# Patient Record
Sex: MALE | Race: WHITE | HISPANIC OR LATINO | Employment: FULL TIME | URBAN - METROPOLITAN AREA
[De-identification: names, ages, dates, MRNs, and addresses within clinical notes are randomized per-mention and may not be internally consistent; named-entity substitution may affect disease eponyms.]

---

## 2020-09-11 ENCOUNTER — TELEPHONE (OUTPATIENT)
Dept: CARDIOLOGY | Facility: MEDICAL CENTER | Age: 45
End: 2020-09-11

## 2020-09-14 ENCOUNTER — OFFICE VISIT (OUTPATIENT)
Dept: CARDIOLOGY | Facility: MEDICAL CENTER | Age: 45
End: 2020-09-14
Payer: COMMERCIAL

## 2020-09-14 VITALS
BODY MASS INDEX: 25.51 KG/M2 | DIASTOLIC BLOOD PRESSURE: 60 MMHG | OXYGEN SATURATION: 97 % | WEIGHT: 178.2 LBS | HEART RATE: 64 BPM | HEIGHT: 70 IN | SYSTOLIC BLOOD PRESSURE: 88 MMHG

## 2020-09-14 DIAGNOSIS — E78.5 HYPERLIPIDEMIA, UNSPECIFIED HYPERLIPIDEMIA TYPE: ICD-10-CM

## 2020-09-14 DIAGNOSIS — I25.10 CORONARY ARTERY DISEASE INVOLVING NATIVE HEART WITHOUT ANGINA PECTORIS, UNSPECIFIED VESSEL OR LESION TYPE: ICD-10-CM

## 2020-09-14 LAB — EKG IMPRESSION: NORMAL

## 2020-09-14 PROCEDURE — 99204 OFFICE O/P NEW MOD 45 MIN: CPT | Mod: 25 | Performed by: INTERNAL MEDICINE

## 2020-09-14 PROCEDURE — 93000 ELECTROCARDIOGRAM COMPLETE: CPT | Performed by: INTERNAL MEDICINE

## 2020-09-14 RX ORDER — ROSUVASTATIN CALCIUM 40 MG/1
40 TABLET, COATED ORAL DAILY
COMMUNITY
Start: 2020-06-26 | End: 2020-10-26 | Stop reason: SDUPTHER

## 2020-09-14 RX ORDER — CLOPIDOGREL BISULFATE 75 MG/1
1 TABLET ORAL DAILY
COMMUNITY
Start: 2020-08-06 | End: 2020-09-14

## 2020-09-14 RX ORDER — NITROGLYCERIN 0.4 MG/1
TABLET SUBLINGUAL
COMMUNITY
Start: 2019-08-02 | End: 2021-08-01

## 2020-09-14 RX ORDER — NABUMETONE 500 MG/1
TABLET, FILM COATED ORAL
COMMUNITY
Start: 2020-02-22 | End: 2020-09-14

## 2020-09-14 RX ORDER — LISINOPRIL 2.5 MG/1
TABLET ORAL
COMMUNITY
Start: 2020-05-12 | End: 2020-09-14

## 2020-09-14 ASSESSMENT — ENCOUNTER SYMPTOMS
ALTERED MENTAL STATUS: 0
COUGH: 0
PND: 0
DIZZINESS: 0
PALPITATIONS: 0
CONSTIPATION: 0
NEAR-SYNCOPE: 0
FLANK PAIN: 0
HEARTBURN: 0
ORTHOPNEA: 0
NAUSEA: 0
BACK PAIN: 0
ABDOMINAL PAIN: 0
SYNCOPE: 0
DEPRESSION: 0
DIARRHEA: 0
IRREGULAR HEARTBEAT: 0
CLAUDICATION: 0
FEVER: 0
WEIGHT LOSS: 0
DYSPNEA ON EXERTION: 0
SHORTNESS OF BREATH: 0
DECREASED APPETITE: 0
VOMITING: 0
BLURRED VISION: 0
WEIGHT GAIN: 0

## 2020-09-14 NOTE — PROGRESS NOTES
Cardiology Note    CAD    History of Present Illness: Konstantin Cheung is a 45 y.o. male PMH CAD, family history premature cardiac disease, HLD who presents for    Previous cardiologist Dr Gadiel Maldonado. Seen for CAD s/p PCI ANN to mLAD and HLD. He was recommended plavix 12 months until 08/2020.     No recurrent cardiac symptoms since PCI. He has been orthostatic, especially when standing from sitting. He is active and currently remodeling his house and denies cardiac complaints. Has modified his diet and eats very little meat. Denies toxic social habits.    Review of Systems   Constitution: Negative for decreased appetite, fever, malaise/fatigue, weight gain and weight loss.   HENT: Negative for congestion and nosebleeds.    Eyes: Negative for blurred vision.   Cardiovascular: Negative for chest pain, claudication, dyspnea on exertion, irregular heartbeat, leg swelling, near-syncope, orthopnea, palpitations, paroxysmal nocturnal dyspnea and syncope.   Respiratory: Negative for cough and shortness of breath.    Endocrine: Negative for cold intolerance and heat intolerance.   Skin: Negative for rash.   Musculoskeletal: Negative for back pain.   Gastrointestinal: Negative for abdominal pain, constipation, diarrhea, heartburn, melena, nausea and vomiting.   Genitourinary: Negative for dysuria, flank pain and hematuria.   Neurological: Negative for dizziness.   Psychiatric/Behavioral: Negative for altered mental status and depression.         Past Medical History:   Diagnosis Date   • CAD (coronary artery disease)          Past Surgical History:   Procedure Laterality Date   • ANGIOPLASTY           Current Outpatient Medications   Medication Sig Dispense Refill   • nitroglycerin (NITROSTAT) 0.4 MG SL Tab Place  under tongue.     • rosuvastatin (CRESTOR) 40 MG tablet Take 40 mg by mouth every day.     • aspirin EC (ECOTRIN) 81 MG Tablet Delayed Response Take 1 Tab by mouth every day. 90 Tab 3     No current  "facility-administered medications for this visit.          No Known Allergies      History reviewed. No pertinent family history.      Social History     Socioeconomic History   • Marital status:      Spouse name: Not on file   • Number of children: Not on file   • Years of education: Not on file   • Highest education level: Not on file   Occupational History   • Not on file   Social Needs   • Financial resource strain: Not on file   • Food insecurity     Worry: Not on file     Inability: Not on file   • Transportation needs     Medical: Not on file     Non-medical: Not on file   Tobacco Use   • Smoking status: Never Smoker   • Smokeless tobacco: Never Used   Substance and Sexual Activity   • Alcohol use: Not on file   • Drug use: Not on file   • Sexual activity: Not on file   Lifestyle   • Physical activity     Days per week: Not on file     Minutes per session: Not on file   • Stress: Not on file   Relationships   • Social connections     Talks on phone: Not on file     Gets together: Not on file     Attends Mandaen service: Not on file     Active member of club or organization: Not on file     Attends meetings of clubs or organizations: Not on file     Relationship status: Not on file   • Intimate partner violence     Fear of current or ex partner: Not on file     Emotionally abused: Not on file     Physically abused: Not on file     Forced sexual activity: Not on file   Other Topics Concern   • Not on file   Social History Narrative   • Not on file         Physical Exam:  Ambulatory Vitals  BP (!) 88/60 (BP Location: Left arm, Patient Position: Sitting, BP Cuff Size: Adult)   Pulse 64   Ht 1.778 m (5' 10\")   Wt 80.8 kg (178 lb 3.2 oz)   SpO2 97%    BP Readings from Last 4 Encounters:   09/14/20 (!) 88/60     Weight/BMI:   Vitals:    09/14/20 1248   BP: (!) 88/60   Weight: 80.8 kg (178 lb 3.2 oz)   Height: 1.778 m (5' 10\")    Body mass index is 25.57 kg/m².  Wt Readings from Last 4 Encounters: "   09/14/20 80.8 kg (178 lb 3.2 oz)       Physical Exam   Constitutional: He is oriented to person, place, and time and well-developed, well-nourished, and in no distress. No distress.   HENT:   Head: Normocephalic and atraumatic.   Eyes: Pupils are equal, round, and reactive to light. Conjunctivae are normal.   Neck: Normal range of motion. Neck supple. No JVD present.   Cardiovascular: Normal rate, regular rhythm, normal heart sounds and intact distal pulses. Exam reveals no gallop and no friction rub.   No murmur heard.  Pulmonary/Chest: Effort normal and breath sounds normal. No respiratory distress. He has no wheezes. He has no rales. He exhibits no tenderness.   Abdominal: Soft. Bowel sounds are normal. He exhibits no distension.   Musculoskeletal:         General: No edema.   Neurological: He is alert and oriented to person, place, and time.   Skin: Skin is warm and dry.   Psychiatric: Affect and judgment normal.       Lab Data Review:  No results found for: CHOLSTRLTOT, LDL, HDL, TRIGLYCERIDE    No results found for: SODIUM, POTASSIUM, CHLORIDE, CO2, GLUCOSE, BUN, CREATININE, BUNCREATRAT, GLOMRATE  CrCl cannot be calculated (No successful lab value found.).  No results found for: ALKPHOSPHAT, ASTSGOT, ALTSGPT, TBILIRUBIN   No results found for: WBC, HCT  No results found for: HBA1C  No components found for: TROP    Labs outside [facility]  8/6/20 HDL 53, LDL 64, tot chol 126    Cardiac Imaging and Procedures Review:      EKG 9/14/20 reviewed by me normal sinus    [facility] 8/6/19  Dominance: Right   Left main:   Large caliber, bifurcates to the left anterior descending (LAD) and left   circumflex (LCx) arteries.   Ostial: 10%. Mid: 10%. Distal: 10%.   LAD:   A large caliber vessel, wraps around the LV apex; it is 100% occluded in   the mid segment. There is retrograde   Prox: 10%. Mid: 100%.   LCx:   A large caliber, vessel; there are two obtuse marginal branches.   Prox: 10%. Mid: 10%. Distal: 30%.   OM1 has a  proximal 60% lesion.   RCA:   A large caliber, vessel which gives rise to large caliber posterior   descending artery (PDA) and large caliber posterolateral vessel (PLV).   Prox: 20%. Mid: 30%. Distal: 20%.   The PDA supplies retrograde collaterals to the LAD and mid to distal   reconstitution is seen.   PCI procedure note:   100% lesion of mid LAD treated with overlapping 3.0x12, 2.5x38mm, 2.25x   24mm Synergy ANN, post dilated with a 3.25mmNC balloon proximally and a   mid to distal 2.5mm NC balloon, converting it to 0% residual stenosis.   Final angiography revealed an excellent result, notable for 0% residual   stenosis, LIZ III flow and no evidence of dissection or perforation.     Femoral angiogram:   right femoral angiography revealed arterial access at the mid femoral   head, above the femoral bifurcation and below the origin of the inferior   epigastric artery     Assessment:   1) Severe mid vessel LAD coronary artery disease.   2) Successful stenting of the mid LAD  lesion with overlapping 3.0x12,   2.5x38mm, 2.25x 24mm Synergy ANN, post dilated with a 3.25mmNC balloon   proximally and a mid to distal 2.5mm NC balloon, reducing a 100% stenosis   to 0%.     Recommendations   Plan:   1) Sheath removal: RRA - TR band and RFA - Perclose.   2) Bedrest for 3 hours.   3) Wrist immobilization for 2 hours with TR band; patent hemostasis   confirmed at time of TR band placement.   4) No lifting items >5 lbs or submerging access site into water for next   5-7 days.   5) Plavix 75mg by mouth once daily for at least 12 months. Aspirin 81mg by   mouth once daily indefinitely.Reload plavix 600mg po x 1 today.   6) Secondary prevention of CAD per current ACCF/AHA guidelines.   6) Disposition: Admit to 220 or equivalent unit for post-PCI observation.    Echocardiogram (8/5/2019):  Findings:   Mild left atrial enlargement. Otherwise normal cardiac chamber sizes.   Nl left ventricular wall thickness with grossly normal  LV systolic   function. Estimated left ventricular ejection fraction = 55-60%. Cannot   exclude very mild distal anteroseptal LV wall hypokinesis.   Diastolic indices are indeterminant of LV diastolic function.   Normal RV systolic function.   Nl cardiac valve structures.   Trace MR. Trace TR.   The right ventricular systolic pressure could not be estimated due to   insufficient tricuspid regurgitation signal.   The inferior vena cava is normal in size and response to respiration,   consistent with normal right-sided filling pressures.   No previous study.     Cardiac CTA (8/2/2019):  1. 1 vessel obstructive coronary artery disease, with 100% occlusion of mid LAD.  2.  This is a right dominant coronary system.  3.  Coronary calcium score: (Agatston):29.(80 %)     TMST (6/14/2019):  Patient exercised on accelerated Jose protocol to a workload of 14.0 mets.    Symptom-limited treadmill test, stopped due to fatigue.   Normal heart rate response to exercise, with resting heart rate of 64 bpm  and  peak heart rate of 157 bpm, which was 89% of maximum predicted heart rate for age.  Normal blood pressure response to exercise, BP heidi from 120/92 at rest to peak of 159/70 mm Hg.  Pt had gradual incrased mid chest discomfort started 2/10 to 6/10 at peak of exercise and there was no EKG abnormalities with exercise.  No arrhythmias seen.  Advise pt to follow up with Dr. Vick for persistent chest discomfort.   CONCLUSION:  Negative for ischemia at high workload.   Average exercise capacity for age.     Medical Decision Making:  Problem List Items Addressed This Visit     Coronary artery disease involving native heart without angina pectoris    Relevant Medications    nitroglycerin (NITROSTAT) 0.4 MG SL Tab    rosuvastatin (CRESTOR) 40 MG tablet    Other Relevant Orders    EKG (Completed)    Hyperlipidemia    Relevant Medications    nitroglycerin (NITROSTAT) 0.4 MG SL Tab    rosuvastatin (CRESTOR) 40 MG tablet        CAD /  PCI - completed 12 months of DAPT. Can d/c plavix. Too orthostatic to tolerate lisinopril and metoprolol; can d/c.     HLD - continue statin. At goal LDL <70. Annual repeat.    It was my pleasure to meet with  Jonatan. 45 min spent reviewing outside records.

## 2020-10-24 ENCOUNTER — PATIENT MESSAGE (OUTPATIENT)
Dept: CARDIOLOGY | Facility: MEDICAL CENTER | Age: 45
End: 2020-10-24

## 2020-10-24 ENCOUNTER — HOSPITAL ENCOUNTER (OUTPATIENT)
Dept: LAB | Facility: MEDICAL CENTER | Age: 45
End: 2020-10-24
Attending: UROLOGY
Payer: COMMERCIAL

## 2020-10-24 LAB
ANION GAP SERPL CALC-SCNC: 8 MMOL/L (ref 7–16)
BUN SERPL-MCNC: 17 MG/DL (ref 8–22)
CALCIUM SERPL-MCNC: 10 MG/DL (ref 8.5–10.5)
CHLORIDE SERPL-SCNC: 99 MMOL/L (ref 96–112)
CO2 SERPL-SCNC: 27 MMOL/L (ref 20–33)
CREAT SERPL-MCNC: 0.86 MG/DL (ref 0.5–1.4)
GLUCOSE SERPL-MCNC: 89 MG/DL (ref 65–99)
POTASSIUM SERPL-SCNC: 4.3 MMOL/L (ref 3.6–5.5)
SODIUM SERPL-SCNC: 134 MMOL/L (ref 135–145)

## 2020-10-24 PROCEDURE — 80048 BASIC METABOLIC PNL TOTAL CA: CPT

## 2020-10-24 PROCEDURE — 84153 ASSAY OF PSA TOTAL: CPT

## 2020-10-24 PROCEDURE — 36415 COLL VENOUS BLD VENIPUNCTURE: CPT

## 2020-10-24 PROCEDURE — 84154 ASSAY OF PSA FREE: CPT

## 2020-10-26 RX ORDER — ROSUVASTATIN CALCIUM 40 MG/1
40 TABLET, COATED ORAL DAILY
Qty: 90 TAB | Refills: 3 | Status: SHIPPED | OUTPATIENT
Start: 2020-10-26 | End: 2021-07-27 | Stop reason: SDUPTHER

## 2020-10-27 LAB
PSA FREE MFR SERPL: 67 %
PSA FREE SERPL-MCNC: 0.2 NG/ML
PSA SERPL-MCNC: 0.3 NG/ML (ref 0–4)

## 2021-07-20 ENCOUNTER — PATIENT MESSAGE (OUTPATIENT)
Dept: CARDIOLOGY | Facility: MEDICAL CENTER | Age: 46
End: 2021-07-20

## 2021-07-21 NOTE — PATIENT COMMUNICATION
LVM with pt at [phone] notifying that MR has appts available early next week. Also instructed with ER precautions.

## 2021-07-27 ENCOUNTER — HOSPITAL ENCOUNTER (OUTPATIENT)
Dept: LAB | Facility: MEDICAL CENTER | Age: 46
End: 2021-07-27
Attending: NURSE PRACTITIONER
Payer: COMMERCIAL

## 2021-07-27 ENCOUNTER — OFFICE VISIT (OUTPATIENT)
Dept: CARDIOLOGY | Facility: MEDICAL CENTER | Age: 46
End: 2021-07-27
Payer: COMMERCIAL

## 2021-07-27 VITALS
SYSTOLIC BLOOD PRESSURE: 104 MMHG | DIASTOLIC BLOOD PRESSURE: 80 MMHG | HEIGHT: 70 IN | OXYGEN SATURATION: 95 % | BODY MASS INDEX: 26.66 KG/M2 | RESPIRATION RATE: 14 BRPM | WEIGHT: 186.2 LBS | HEART RATE: 62 BPM

## 2021-07-27 DIAGNOSIS — I25.118 CORONARY ARTERY DISEASE OF NATIVE ARTERY OF NATIVE HEART WITH STABLE ANGINA PECTORIS (HCC): ICD-10-CM

## 2021-07-27 DIAGNOSIS — I20.9 ANGINA PECTORIS (HCC): ICD-10-CM

## 2021-07-27 DIAGNOSIS — Z95.5 S/P DRUG ELUTING CORONARY STENT PLACEMENT: ICD-10-CM

## 2021-07-27 DIAGNOSIS — Z91.89 OTHER SPECIFIED PERSONAL RISK FACTORS, NOT ELSEWHERE CLASSIFIED: ICD-10-CM

## 2021-07-27 DIAGNOSIS — E78.5 HYPERLIPIDEMIA, UNSPECIFIED HYPERLIPIDEMIA TYPE: ICD-10-CM

## 2021-07-27 PROBLEM — Z80.42 FAMILY HISTORY OF MALIGNANT NEOPLASM OF PROSTATE: Status: ACTIVE | Noted: 2018-01-15

## 2021-07-27 PROBLEM — I20.89 STABLE ANGINA (HCC): Status: ACTIVE | Noted: 2019-08-26

## 2021-07-27 PROBLEM — I25.10 CORONARY ARTERY DISEASE INVOLVING NATIVE HEART WITHOUT ANGINA PECTORIS: Status: RESOLVED | Noted: 2020-09-14 | Resolved: 2021-07-27

## 2021-07-27 PROBLEM — A60.00 GENITAL HERPES SIMPLEX: Status: ACTIVE | Noted: 2019-04-02

## 2021-07-27 PROBLEM — L57.0 ACTINIC KERATOSIS: Status: ACTIVE | Noted: 2018-01-15

## 2021-07-27 LAB
CHOLEST SERPL-MCNC: 128 MG/DL (ref 100–199)
EKG IMPRESSION: NORMAL
FASTING STATUS PATIENT QL REPORTED: NORMAL
HDLC SERPL-MCNC: 50 MG/DL
LDLC SERPL CALC-MCNC: 68 MG/DL
TRIGL SERPL-MCNC: 48 MG/DL (ref 0–149)

## 2021-07-27 PROCEDURE — 93000 ELECTROCARDIOGRAM COMPLETE: CPT | Performed by: INTERNAL MEDICINE

## 2021-07-27 PROCEDURE — 80061 LIPID PANEL: CPT

## 2021-07-27 PROCEDURE — 36415 COLL VENOUS BLD VENIPUNCTURE: CPT

## 2021-07-27 PROCEDURE — 99214 OFFICE O/P EST MOD 30 MIN: CPT | Performed by: NURSE PRACTITIONER

## 2021-07-27 RX ORDER — ROSUVASTATIN CALCIUM 40 MG/1
40 TABLET, COATED ORAL DAILY
Qty: 90 TABLET | Refills: 3 | Status: SHIPPED | OUTPATIENT
Start: 2021-07-27 | End: 2022-07-29 | Stop reason: SDUPTHER

## 2021-07-27 RX ORDER — RANOLAZINE 500 MG/1
500 TABLET, EXTENDED RELEASE ORAL 2 TIMES DAILY
Qty: 60 TABLET | Refills: 3 | Status: SHIPPED | OUTPATIENT
Start: 2021-07-27 | End: 2022-12-06

## 2021-07-27 NOTE — PROGRESS NOTES
Cardiology Clinic Follow-up Note    Date of note:    7/27/2021  Primary Care Provider: Amie Tolbert M.D.    Name:             Konstantin Cheung  YOB: 1975  MRN:               [id number]    CC: Chest pressure    Primary Cardiologist: Dr. Farley    Patient HPI:   Konstantin Cheung is a 46 y.o. male with PMH including CAD, s/p PCI with overlapping ANN x3 to LAD in 8/2019, family history premature cardiac disease, and HLD.     Interim History:  Mr. Cheung was last seen in this cardiology office by Dr. Farley in 9/2020. At that time his lisinopril and metoprolol were stopped due to hypotension.  He had completed his 12 months of DAPT and Plavix was discontinued.     Over the past week he has been feeling similar chest pain/pressure as he did prior to stent placement in 2019 at [facility].  He explains at that time he had a complete work-up including PET scan, treadmill stress test, EKG, and finally CT angiogram of heart revealed 100% occlusion of LAD.    The chest pains only come on during exercise when he is riding his stationary bike.  He feels substernal chest pressure 3/10, does not allow him to take a deep breath.  Upon stopping exercising the pain subsides after a few minutes.  He has not exercised in the past few days as this similar chest pressure has him nervous.  Does have nitro as well, has not needed to take.    Patient endorses medication compliance with Crestor and asa 81mg.  Maintains a heart healthy diet    He does not have a PCP.  He exercises regularly on stationary bike.     Cardiovascular Risk Factors:  1. Smoking status: No  2. Type II Diabetes Mellitus: no  3. Hypertension: no  4. Dyslipidemia: Yes  5. Family history of early Coronary Artery Disease in a first degree relative (Male less than 55 years of age; Female less than 65 years of age): yes (Father had CABG at young age)  6. Obesity and/or Metabolic Syndrome: BMI 26.7  7.  Sedentary lifestyle: very active      Review of systems:  All others systems reviewed and negative except for what is outlined in the above HPI    Past Medical History:   Diagnosis Date   • CAD (coronary artery disease)      Past Surgical History:   Procedure Laterality Date   • ANGIOPLASTY       History reviewed. No pertinent family history.  Social History     Socioeconomic History   • Marital status:      Spouse name: Not on file   • Number of children: Not on file   • Years of education: Not on file   • Highest education level: Not on file   Occupational History   • Not on file   Tobacco Use   • Smoking status: Never Smoker   • Smokeless tobacco: Never Used   Substance and Sexual Activity   • Alcohol use: Yes     Comment: rare   • Drug use: Yes     Comment: CBD   • Sexual activity: Not on file   Other Topics Concern   • Not on file   Social History Narrative   • Not on file     Social Determinants of Health     Financial Resource Strain:    • Difficulty of Paying Living Expenses:    Food Insecurity:    • Worried About Running Out of Food in the Last Year:    • Ran Out of Food in the Last Year:    Transportation Needs:    • Lack of Transportation (Medical):    • Lack of Transportation (Non-Medical):    Physical Activity:    • Days of Exercise per Week:    • Minutes of Exercise per Session:    Stress:    • Feeling of Stress :    Social Connections:    • Frequency of Communication with Friends and Family:    • Frequency of Social Gatherings with Friends and Family:    • Attends Gnosticist Services:    • Active Member of Clubs or Organizations:    • Attends Club or Organization Meetings:    • Marital Status:    Intimate Partner Violence:    • Fear of Current or Ex-Partner:    • Emotionally Abused:    • Physically Abused:    • Sexually Abused:      No Known Allergies  Current Outpatient Medications   Medication Sig Dispense Refill   • rosuvastatin (CRESTOR) 40 MG tablet Take 1 tablet by mouth every day. 90  "tablet 3   • nitroglycerin (NITROSTAT) 0.4 MG SL Tab Place  under tongue.     • aspirin EC (ECOTRIN) 81 MG Tablet Delayed Response Take 1 Tab by mouth every day. 90 Tab 3     No current facility-administered medications for this visit.       Physical Exam:  Ambulatory Vitals  /80 (BP Location: Left arm, Patient Position: Sitting, BP Cuff Size: Adult)   Pulse 62   Resp 14   Ht 1.778 m (5' 10\")   Wt 84.5 kg (186 lb 3.2 oz)   SpO2 95%    BP Readings from Last 4 Encounters:   07/27/21 104/80   09/14/20 (!) 88/60       Weight/BMI: Body mass index is 26.72 kg/m².  Wt Readings from Last 4 Encounters:   07/27/21 84.5 kg (186 lb 3.2 oz)   09/14/20 80.8 kg (178 lb 3.2 oz)       General: No apparent distress. Well nourished.   Neck: No JVD. No caroid bruits, trachea midline  Lungs: CTAB. Normal effort, without crackles/rhonchi, no wheezing  Heart: Bradycardia. Normal S1/S2, no murmur, no rub. no lower extremity edema. 2+ radial pulses, 2+ DT pulses  Ext: No clubbing or cyanosis.  Abdomen: soft, non tender, non distended, no charli hepatomegaly.  Neurological: No focal deficits, no facial asymmetry.  Normal speech.  Psychiatric: Appropriate affect, alert and oriented x 4.   Skin: Warm and dry, no rash.    Lab Data Review:  No results found for: CHOLSTRLTOT, LDL, HDL, TRIGLYCERIDE    Lab Results   Component Value Date/Time    SODIUM 134 (L) 10/24/2020 09:40 AM    POTASSIUM 4.3 10/24/2020 09:40 AM    CHLORIDE 99 10/24/2020 09:40 AM    CO2 27 10/24/2020 09:40 AM    GLUCOSE 89 10/24/2020 09:40 AM    BUN 17 10/24/2020 09:40 AM    CREATININE 0.86 10/24/2020 09:40 AM     Lab Results   Component Value Date/Time    ALKPHOSPHAT 46 11/14/2019 07:47 AM    ASTSGOT 39 11/14/2019 07:47 AM    ALTSGPT 84 (H) 11/14/2019 07:47 AM      No results found for: WBC      Cardiac Imaging and Procedures Review:      EKG 7/27/21: My Personal interpretation reveals SB 52, slight ST elevation in V2       (Following imagining reviewed through Care " Everywhere, performed at [facility]  Echocardiogram (8/5/2019):  Findings:   Mild left atrial enlargement. Otherwise normal cardiac chamber sizes.   Nl left ventricular wall thickness with grossly normal LV systolic   function. Estimated left ventricular ejection fraction = 55-60%. Cannot   exclude very mild distal anteroseptal LV wall hypokinesis.   Diastolic indices are indeterminant of LV diastolic function.   Normal RV systolic function.   Nl cardiac valve structures.   Trace MR. Trace TR.   The right ventricular systolic pressure could not be estimated due to   insufficient tricuspid regurgitation signal.   The inferior vena cava is normal in size and response to respiration,   consistent with normal right-sided filling pressures.   No previous study.     Cardiac CTA (8/2/2019):  1. 1 vessel obstructive coronary artery disease, with 100% occlusion of mid LAD.  2.  This is a right dominant coronary system.  3.  Coronary calcium score: (Agatston):29.(80 %)     TMST (6/14/2019):  Patient exercised on accelerated Jose protocol to a workload of 14.0 mets.    Symptom-limited treadmill test, stopped due to fatigue.   Normal heart rate response to exercise, with resting heart rate of 64 bpm  and  peak heart rate of 157 bpm, which was 89% of maximum predicted heart rate for age.  Normal blood pressure response to exercise, BP heidi from 120/92 at rest to peak of 159/70 mm Hg.  Pt had gradual incrased mid chest discomfort started 2/10 to 6/10 at peak of exercise and there was no EKG abnormalities with exercise.  No arrhythmias seen.  Advise pt to follow up with Dr. Vick for persistent chest discomfort.   CONCLUSION:  Negative for ischemia at high workload.   Average exercise capacity for age.      [facility] 8/6/19  Dominance: Right   Left main:   Large caliber, bifurcates to the left anterior descending (LAD) and left   circumflex (LCx) arteries.   Ostial: 10%. Mid: 10%. Distal: 10%.   LAD:   A large  caliber vessel, wraps around the LV apex; it is 100% occluded in   the mid segment. There is retrograde   Prox: 10%. Mid: 100%.   LCx:   A large caliber, vessel; there are two obtuse marginal branches.   Prox: 10%. Mid: 10%. Distal: 30%.   OM1 has a proximal 60% lesion.   RCA:   A large caliber, vessel which gives rise to large caliber posterior   descending artery (PDA) and large caliber posterolateral vessel (PLV).   Prox: 20%. Mid: 30%. Distal: 20%.   The PDA supplies retrograde collaterals to the LAD and mid to distal   reconstitution is seen.   PCI procedure note:   100% lesion of mid LAD treated with overlapping 3.0x12, 2.5x38mm, 2.25x   24mm Synergy ANN, post dilated with a 3.25mmNC balloon proximally and a   mid to distal 2.5mm NC balloon, converting it to 0% residual stenosis.   Final angiography revealed an excellent result, notable for 0% residual   stenosis, LIZ III flow and no evidence of dissection or perforation.     Femoral angiogram:   right femoral angiography revealed arterial access at the mid femoral   head, above the femoral bifurcation and below the origin of the inferior   epigastric artery     Assessment:   1) Severe mid vessel LAD coronary artery disease.   2) Successful stenting of the mid LAD  lesion with overlapping 3.0x12,   2.5x38mm, 2.25x 24mm Synergy ANN, post dilated with a 3.25mmNC balloon   proximally and a mid to distal 2.5mm NC balloon, reducing a 100% stenosis   to 0%.         Assessment and Clinical Decision Makin. Hyperlipidemia, unspecified hyperlipidemia type  Lipid Profile   2. Angina pectoris (HCC)  EC-ECHOCARDIOGRAM COMPLETE W/O CONT   3. S/P drug eluting coronary stent placement  EC-ECHOCARDIOGRAM COMPLETE W/O CONT     mid LAD  lesion with overlapping 3.0x12,   2.5x38mm, 2.25x 24mm Synergy ANN   4. Coronary artery disease of native artery of native heart with stable angina pectoris (HCC)  EC-ECHOCARDIOGRAM COMPLETE W/O CONT   5. Other specified personal risk  factors, not elsewhere classified  CT-CTA HEART W/3D IMAGE     The following treatment plan was discussed    Hyperlipidemia  -Continue high intensity statin, Crestor 40mg  -Lipid panel ordered    Stable angina pectoris  -Chest pain during exercise, relieved at rest  -CTA heart ordered  -If positive may need LHC  -We discussed s/sx of ACS, when to seek emergent care    S/P overlapping ANN x3 to mid LAD   -Completed entire 12 months of DAPT, stopped 8/2020  -Continues on aspirin 81 mg daily  -Due to hypotension unable to tolerate metoprolol and lisinopril  -BP's continue to be low 100's/60's  -Ordered Echocardiogram, last echo was in 2019     Plan reviewed in detail with the patient, verbalizes understanding and is in agreement.  Pt is to follow up with me in 1 month or sooner after CTA heart. Also discussed I would follow-up through Mychart to discuss results.      Encouraged Pt to follow up with us over the phone or electronically using my MyChart as cardiac issues/concerns arise.    PLEASE NOTE: This dictation was created using voice recognition software. I have made every reasonable attempt to correct obvious errors, but I expect that there are errors of grammar and possibly content that I did not discover before finalizing the note.       [email or web address]   [facility]  [phone]    Collaborating Physician: Dr. Tolbert    Addendum:  Discussed patient with Dr. Farley, CTA of heart would most likely have too much artifact given coronary stents.  We will trial Ranolazine 500 mg p.o. twice daily for stable angina.  If CP continues with exercise patient may need LHC.

## 2021-08-03 ENCOUNTER — TELEPHONE (OUTPATIENT)
Dept: CARDIOLOGY | Facility: MEDICAL CENTER | Age: 46
End: 2021-08-03

## 2021-08-03 DIAGNOSIS — I20.89 STABLE ANGINA (HCC): ICD-10-CM

## 2021-08-03 NOTE — TELEPHONE ENCOUNTER
Called and spoke with Pt. He did not want to try the new medication Ranexa initially when prescribed because he felt it was just a bandaid to the problem. Today he started taking it as he has been experiencing more frequent chest pain now during the day, with walking up the stairs and while around children, not just during exercise. The chest pain is going away rather quickly with rest and deep breaths, he thinks it may be being brought on during episodes of stress, but it has been happening randomly.   We discussed ordering a nuc med treadmill stress test, to be performed asap. Orders placed. Also discussed further plan if Nuc Med stress positive.  Discussed that Ranexa can potentially help with the angina. If not we can increase dose. He will keep in touch with us if symptoms worsen or do not improve with Ranexa.   Also gave instruction as when to seek emergent care. He is very aware and will not hesitate to be seen in ER.

## 2021-08-04 ENCOUNTER — TELEPHONE (OUTPATIENT)
Dept: CARDIOLOGY | Facility: MEDICAL CENTER | Age: 46
End: 2021-08-04

## 2021-08-04 RX ORDER — NITROGLYCERIN 0.4 MG/1
0.4 TABLET SUBLINGUAL PRN
Qty: 25 TABLET | Refills: 0 | Status: SHIPPED | OUTPATIENT
Start: 2021-08-04 | End: 2022-12-06

## 2021-08-30 ENCOUNTER — OFFICE VISIT (OUTPATIENT)
Dept: CARDIOLOGY | Facility: MEDICAL CENTER | Age: 46
End: 2021-08-30
Payer: COMMERCIAL

## 2021-08-30 VITALS
HEIGHT: 70 IN | RESPIRATION RATE: 12 BRPM | DIASTOLIC BLOOD PRESSURE: 76 MMHG | HEART RATE: 59 BPM | OXYGEN SATURATION: 95 % | SYSTOLIC BLOOD PRESSURE: 110 MMHG | WEIGHT: 187 LBS | BODY MASS INDEX: 26.77 KG/M2

## 2021-08-30 DIAGNOSIS — I20.89 STABLE ANGINA (HCC): ICD-10-CM

## 2021-08-30 PROCEDURE — 99213 OFFICE O/P EST LOW 20 MIN: CPT | Performed by: NURSE PRACTITIONER

## 2021-08-30 NOTE — PROGRESS NOTES
Cardiology Clinic Follow-up Note    Date of note:    8/30/2021  Primary Care Provider: Amie Tolbert M.D.    Name:             Konstantin Cheung  YOB: 1975  MRN:               [id number]    CC: Chest pressure    Primary Cardiologist: Dr. Farley    Patient HPI:   Konstantin Cheung is a 46 y.o. male with PMH including CAD, s/p PCI with overlapping ANN x3 to LAD in 8/2019, family history premature cardiac disease, and HLD.     Interim History:  Mr. Cheung was last seen in this cardiology office by Dr. Farley in 9/2020 and then by myself on 7/27/21 with increasing chest pain while riding his stationary bike.  It was discussed with Dr. Farley and decision was made to trial ranolazine 500 mg twice daily and schedule for nuc med stress test, unfortunately he has not been able to schedule the Nuc Med stress test    Since last appointment 1 month ago he has been feeling much better with taking ranolazine.  He has not attempted to start exercising yet however.  He continues to carry nitro SL in his pocket.    Patient endorses medication compliance. He denies palpitations, chest pain, shortness of breath, dyspnea on exertion, dizziness or syncopal episodes, orthopnea, PND, lower extremity swelling, and recent weight gain.     He does not have a PCP.      Cardiovascular Risk Factors:  1. Smoking status: No  2. Type II Diabetes Mellitus: no  3. Hypertension: no  4. Dyslipidemia: Yes, LDL 68 on 7/27/21  5. Family history of early Coronary Artery Disease in a first degree relative (Male less than 55 years of age; Female less than 65 years of age): yes (Father had CABG at young age)  6. Obesity and/or Metabolic Syndrome: BMI 26.7  7. Sedentary lifestyle: very active    Review of systems:  All others systems reviewed and negative except for what is outlined in the above HPI    Past Medical History:   Diagnosis Date   • CAD (coronary artery disease)      Past Surgical History:   Procedure  Laterality Date   • ANGIOPLASTY       History reviewed. No pertinent family history.  Social History     Socioeconomic History   • Marital status:      Spouse name: Not on file   • Number of children: Not on file   • Years of education: Not on file   • Highest education level: Not on file   Occupational History   • Not on file   Tobacco Use   • Smoking status: Never Smoker   • Smokeless tobacco: Never Used   Substance and Sexual Activity   • Alcohol use: Yes     Comment: rare   • Drug use: Yes     Comment: CBD   • Sexual activity: Not on file   Other Topics Concern   • Not on file   Social History Narrative   • Not on file     Social Determinants of Health     Financial Resource Strain:    • Difficulty of Paying Living Expenses:    Food Insecurity:    • Worried About Running Out of Food in the Last Year:    • Ran Out of Food in the Last Year:    Transportation Needs:    • Lack of Transportation (Medical):    • Lack of Transportation (Non-Medical):    Physical Activity:    • Days of Exercise per Week:    • Minutes of Exercise per Session:    Stress:    • Feeling of Stress :    Social Connections:    • Frequency of Communication with Friends and Family:    • Frequency of Social Gatherings with Friends and Family:    • Attends [employer] Services:    • Active Member of Clubs or Organizations:    • Attends Club or Organization Meetings:    • Marital Status:    Intimate Partner Violence:    • Fear of Current or Ex-Partner:    • Emotionally Abused:    • Physically Abused:    • Sexually Abused:      No Known Allergies  Current Outpatient Medications   Medication Sig Dispense Refill   • nitroglycerin (NITROSTAT) 0.4 MG SL Tab Place 1 tablet under the tongue as needed for Chest Pain (take every 5 mins for max of three doses (if chest pain not relieved, go to ER)). 25 tablet 0   • rosuvastatin (CRESTOR) 40 MG tablet Take 1 tablet by mouth every day. 90 tablet 3   • ranolazine (RANEXA) 500 MG TABLET SR 12 HR Take 1  "tablet by mouth 2 times a day. 60 tablet 3   • aspirin EC (ECOTRIN) 81 MG Tablet Delayed Response Take 1 Tab by mouth every day. 90 Tab 3     No current facility-administered medications for this visit.       Physical Exam:  Ambulatory Vitals  /76 (BP Location: Left arm, Patient Position: Sitting, BP Cuff Size: Adult)   Pulse (!) 59   Resp 12   Ht 1.778 m (5' 10\")   Wt 84.8 kg (187 lb)   SpO2 95%    BP Readings from Last 4 Encounters:   08/30/21 110/76   07/27/21 104/80   09/14/20 (!) 88/60       Weight/BMI: Body mass index is 26.83 kg/m².  Wt Readings from Last 4 Encounters:   08/30/21 84.8 kg (187 lb)   07/27/21 84.5 kg (186 lb 3.2 oz)   09/14/20 80.8 kg (178 lb 3.2 oz)       General: No apparent distress. Well nourished.   Neck: No JVD. No caroid bruits, trachea midline  Lungs: CTAB. Normal effort, without crackles/rhonchi, no wheezing  Heart: Bradycardia. Normal S1/S2, no murmur, no rub. no lower extremity edema. 2+ radial pulses, 2+ DT pulses  Ext: No clubbing or cyanosis.  Abdomen: soft, non tender, non distended, no charli hepatomegaly.  Neurological: No focal deficits, no facial asymmetry.  Normal speech.  Psychiatric: Appropriate affect, alert and oriented x 4.   Skin: Warm and dry, no rash.    Lab Data Review:  Lab Results   Component Value Date/Time    CHOLSTRLTOT 128 07/27/2021 09:40 AM    LDL 68 07/27/2021 09:40 AM    HDL 50 07/27/2021 09:40 AM    TRIGLYCERIDE 48 07/27/2021 09:40 AM       Lab Results   Component Value Date/Time    SODIUM 134 (L) 10/24/2020 09:40 AM    POTASSIUM 4.3 10/24/2020 09:40 AM    CHLORIDE 99 10/24/2020 09:40 AM    CO2 27 10/24/2020 09:40 AM    GLUCOSE 89 10/24/2020 09:40 AM    BUN 17 10/24/2020 09:40 AM    CREATININE 0.86 10/24/2020 09:40 AM     Lab Results   Component Value Date/Time    ALKPHOSPHAT 46 11/14/2019 07:47 AM    ASTSGOT 39 11/14/2019 07:47 AM    ALTSGPT 84 (H) 11/14/2019 07:47 AM      No results found for: WBC      Cardiac Imaging and Procedures Review:  "     EKG 7/27/21: My Personal interpretation reveals SB 52, slight ST elevation in V2       (Following imagining reviewed through [facility] Everywhere, performed at [facility])  Echocardiogram (8/5/2019):  Findings:   Mild left atrial enlargement. Otherwise normal cardiac chamber sizes.   Nl left ventricular wall thickness with grossly normal LV systolic   function. Estimated left ventricular ejection fraction = 55-60%. Cannot   exclude very mild distal anteroseptal LV wall hypokinesis.   Diastolic indices are indeterminant of LV diastolic function.   Normal RV systolic function.   Nl cardiac valve structures.   Trace MR. Trace TR.   The right ventricular systolic pressure could not be estimated due to   insufficient tricuspid regurgitation signal.   The inferior vena cava is normal in size and response to respiration,   consistent with normal right-sided filling pressures.   No previous study.     Cardiac CTA (8/2/2019):  1. 1 vessel obstructive coronary artery disease, with 100% occlusion of mid LAD.  2.  This is a right dominant coronary system.  3.  Coronary calcium score: (Agatston):29.(80 %)     TMST (6/14/2019):  Patient exercised on accelerated Jose protocol to a workload of 14.0 mets.    Symptom-limited treadmill test, stopped due to fatigue.   Normal heart rate response to exercise, with resting heart rate of 64 bpm  and  peak heart rate of 157 bpm, which was 89% of maximum predicted heart rate for age.  Normal blood pressure response to exercise, BP heidi from 120/92 at rest to peak of 159/70 mm Hg.  Pt had gradual incrased mid chest discomfort started 2/10 to 6/10 at peak of exercise and there was no EKG abnormalities with exercise.  No arrhythmias seen.  Advise pt to follow up with Dr. Vick for persistent chest discomfort.   CONCLUSION:  Negative for ischemia at high workload.   Average exercise capacity for age.      [facility] 8/6/19  Dominance: Right   Left main:   Large caliber,  bifurcates to the left anterior descending (LAD) and left   circumflex (LCx) arteries.   Ostial: 10%. Mid: 10%. Distal: 10%.   LAD:   A large caliber vessel, wraps around the LV apex; it is 100% occluded in   the mid segment. There is retrograde   Prox: 10%. Mid: 100%.   LCx:   A large caliber, vessel; there are two obtuse marginal branches.   Prox: 10%. Mid: 10%. Distal: 30%.   OM1 has a proximal 60% lesion.   RCA:   A large caliber, vessel which gives rise to large caliber posterior   descending artery (PDA) and large caliber posterolateral vessel (PLV).   Prox: 20%. Mid: 30%. Distal: 20%.   The PDA supplies retrograde collaterals to the LAD and mid to distal   reconstitution is seen.   PCI procedure note:   100% lesion of mid LAD treated with overlapping 3.0x12, 2.5x38mm, 2.25x   24mm Synergy ANN, post dilated with a 3.25mmNC balloon proximally and a   mid to distal 2.5mm NC balloon, converting it to 0% residual stenosis.   Final angiography revealed an excellent result, notable for 0% residual   stenosis, LIZ III flow and no evidence of dissection or perforation.     Femoral angiogram:   right femoral angiography revealed arterial access at the mid femoral   head, above the femoral bifurcation and below the origin of the inferior   epigastric artery     Assessment:   1) Severe mid vessel LAD coronary artery disease.   2) Successful stenting of the mid LAD  lesion with overlapping 3.0x12,   2.5x38mm, 2.25x 24mm Synergy ANN, post dilated with a 3.25mmNC balloon   proximally and a mid to distal 2.5mm NC balloon, reducing a 100% stenosis   to 0%.     Assessment and Clinical Decision Makin. Stable angina (HCC)  NM-CARDIAC STRESS TEST     The following treatment plan was discussed    Hyperlipidemia  -Continue high intensity statin, Crestor 40mg    Stable angina pectoris  -Has decreased with initiation of ranolazine x 1 month  -Not exercising yet, but plans on starting slowly today  -Pending Nuc Med stress  test  -We discussed s/sx of ACS, when to seek emergent care    S/P overlapping ANN x3 to mid LAD   -Completed entire 12 months of DAPT, stopped 8/2020  -Continues on aspirin 81 mg daily  -Due to hypotension unable to tolerate metoprolol and lisinopril  -BP's continue to be low 100's/60's  -Echocardiogram pending, last echo was in 2019     Plan reviewed in detail with the patient, verbalizes understanding and is in agreement.  Pt is to follow up with me in after Nuc Med study complete, will also follow-up through [facility] to discuss results.      Encouraged Pt to follow up with us over the phone or electronically using my MyChart as cardiac issues/concerns arise.    PLEASE NOTE: This dictation was created using voice recognition software. I have made every reasonable attempt to correct obvious errors, but I expect that there are errors of grammar and possibly content that I did not discover before finalizing the note.       VALENTINO Gracia   [facility]  [phone]    Collaborating Physician: Dr. Stallings

## 2021-09-23 ENCOUNTER — APPOINTMENT (OUTPATIENT)
Dept: RADIOLOGY | Facility: MEDICAL CENTER | Age: 46
End: 2021-09-23
Attending: NURSE PRACTITIONER
Payer: COMMERCIAL

## 2021-10-05 ENCOUNTER — HOSPITAL ENCOUNTER (OUTPATIENT)
Dept: RADIOLOGY | Facility: MEDICAL CENTER | Age: 46
End: 2021-10-05
Attending: NURSE PRACTITIONER
Payer: COMMERCIAL

## 2021-10-05 DIAGNOSIS — I20.89 STABLE ANGINA (HCC): ICD-10-CM

## 2021-10-05 PROCEDURE — A9502 TC99M TETROFOSMIN: HCPCS

## 2021-10-05 PROCEDURE — 78452 HT MUSCLE IMAGE SPECT MULT: CPT | Mod: 26 | Performed by: INTERNAL MEDICINE

## 2021-10-05 PROCEDURE — 93018 CV STRESS TEST I&R ONLY: CPT | Performed by: INTERNAL MEDICINE

## 2021-10-18 ENCOUNTER — HOSPITAL ENCOUNTER (OUTPATIENT)
Dept: CARDIOLOGY | Facility: MEDICAL CENTER | Age: 46
End: 2021-10-18
Attending: NURSE PRACTITIONER
Payer: COMMERCIAL

## 2021-10-18 DIAGNOSIS — I20.9 ANGINA PECTORIS (HCC): ICD-10-CM

## 2021-10-18 DIAGNOSIS — Z95.5 S/P DRUG ELUTING CORONARY STENT PLACEMENT: ICD-10-CM

## 2021-10-18 DIAGNOSIS — I25.118 CORONARY ARTERY DISEASE OF NATIVE ARTERY OF NATIVE HEART WITH STABLE ANGINA PECTORIS (HCC): ICD-10-CM

## 2021-10-18 LAB
LV EJECT FRACT  99904: 55
LV EJECT FRACT MOD 2C 99903: 61.4
LV EJECT FRACT MOD 4C 99902: 49.14
LV EJECT FRACT MOD BP 99901: 54.82

## 2021-10-18 PROCEDURE — 93306 TTE W/DOPPLER COMPLETE: CPT | Mod: 26 | Performed by: INTERNAL MEDICINE

## 2021-10-18 PROCEDURE — 93306 TTE W/DOPPLER COMPLETE: CPT

## 2022-07-29 DIAGNOSIS — E78.5 HYPERLIPIDEMIA, UNSPECIFIED HYPERLIPIDEMIA TYPE: ICD-10-CM

## 2022-08-01 RX ORDER — ROSUVASTATIN CALCIUM 40 MG/1
40 TABLET, COATED ORAL DAILY
Qty: 90 TABLET | Refills: 0 | Status: SHIPPED | OUTPATIENT
Start: 2022-08-01 | End: 2022-10-27

## 2022-10-26 DIAGNOSIS — E78.5 HYPERLIPIDEMIA, UNSPECIFIED HYPERLIPIDEMIA TYPE: ICD-10-CM

## 2022-10-26 NOTE — TELEPHONE ENCOUNTER
Is the patient due for a refill? Yes    Was the patient seen the past year? Yes    Date of last office visit: 8/30/21    Does the patient have an upcoming appointment?  No   If yes, When?     Provider to refill:MR    Does the patients insurance require a 100 day supply?  No

## 2022-10-27 RX ORDER — ROSUVASTATIN CALCIUM 40 MG/1
40 TABLET, COATED ORAL DAILY
Qty: 30 TABLET | Refills: 0 | Status: SHIPPED | OUTPATIENT
Start: 2022-10-27 | End: 2022-11-03 | Stop reason: SDUPTHER

## 2022-11-03 ENCOUNTER — PATIENT MESSAGE (OUTPATIENT)
Dept: CARDIOLOGY | Facility: MEDICAL CENTER | Age: 47
End: 2022-11-03

## 2022-11-03 ENCOUNTER — OFFICE VISIT (OUTPATIENT)
Dept: URGENT CARE | Facility: CLINIC | Age: 47
End: 2022-11-03
Payer: COMMERCIAL

## 2022-11-03 VITALS
RESPIRATION RATE: 18 BRPM | DIASTOLIC BLOOD PRESSURE: 78 MMHG | SYSTOLIC BLOOD PRESSURE: 116 MMHG | BODY MASS INDEX: 28.06 KG/M2 | TEMPERATURE: 98 F | HEIGHT: 70 IN | OXYGEN SATURATION: 96 % | WEIGHT: 196 LBS | HEART RATE: 84 BPM

## 2022-11-03 DIAGNOSIS — E78.5 HYPERLIPIDEMIA, UNSPECIFIED HYPERLIPIDEMIA TYPE: ICD-10-CM

## 2022-11-03 DIAGNOSIS — M54.31 SCIATICA OF RIGHT SIDE: ICD-10-CM

## 2022-11-03 PROCEDURE — 99213 OFFICE O/P EST LOW 20 MIN: CPT | Performed by: PHYSICIAN ASSISTANT

## 2022-11-03 RX ORDER — KETOROLAC TROMETHAMINE 30 MG/ML
30 INJECTION, SOLUTION INTRAMUSCULAR; INTRAVENOUS ONCE
Status: COMPLETED | OUTPATIENT
Start: 2022-11-03 | End: 2022-11-03

## 2022-11-03 RX ORDER — NAPROXEN 500 MG/1
500 TABLET ORAL 2 TIMES DAILY WITH MEALS
Qty: 60 TABLET | Refills: 1 | Status: SHIPPED | OUTPATIENT
Start: 2022-11-03 | End: 2022-12-06

## 2022-11-03 RX ORDER — CYCLOBENZAPRINE HCL 10 MG
10 TABLET ORAL EVERY 8 HOURS PRN
Qty: 30 TABLET | Refills: 0 | Status: SHIPPED | OUTPATIENT
Start: 2022-11-03 | End: 2022-12-06

## 2022-11-03 RX ORDER — ROSUVASTATIN CALCIUM 40 MG/1
40 TABLET, COATED ORAL DAILY
Qty: 90 TABLET | Refills: 0 | Status: SHIPPED | OUTPATIENT
Start: 2022-11-03 | End: 2022-11-03 | Stop reason: SDUPTHER

## 2022-11-03 RX ADMIN — KETOROLAC TROMETHAMINE 30 MG: 30 INJECTION, SOLUTION INTRAMUSCULAR; INTRAVENOUS at 09:17

## 2022-11-03 ASSESSMENT — ENCOUNTER SYMPTOMS
BACK PAIN: 1
SENSORY CHANGE: 0
FOCAL WEAKNESS: 0
FEVER: 0
CHILLS: 0

## 2022-11-03 NOTE — TELEPHONE ENCOUNTER
MEDICATION REFILL : MR    Caller: Konstantin Cheung    Medication Name and Dosage:     ROSUVASTATIN 40MG     Please call your pharmacy and have them send us a refill request or speak to a live representative, RX number may have changed.    Medication amount left: 10 DAYS    Preferred Pharmacy:     [facility] [address]    Other questions (Topic): NONE    Callback Number (Will only call for issues): [phone] (home)

## 2022-11-03 NOTE — TELEPHONE ENCOUNTER
Is the patient due for a refill? Yes    Was the patient seen the past year? No    Date of last office visit: 8/30/2021    Does the patient have an upcoming appointment?  Yes   If yes, When? 12/6/2022    Provider to refill:    Does the patients insurance require a 100 day supply?  No

## 2022-11-03 NOTE — PROGRESS NOTES
"Subjective:   Konstantin Cheung  is a 47 y.o. male who presents for Back Pain (X5days Lower back pain/ radiating down right leg/groin/)      Back Pain  This is a new problem. The current episode started in the past 7 days. Pertinent negatives include no fever.     Patient resents urgent care noting back pain times last 5 days.  Patient states he rode on his bicycle  which he does frequently.  Onset of pain thereafter.  Denies trauma or injury associated.  Patient denies history of similar pains.  Denies history of back surgery, injury or injections.  Patient reports minimal attempts at treatment.  Describes right low back pain with radiation to right groin and right posterior thigh.  Patient denies numbness tingling or weakness.  Denies saddle anesthesia or incontinence.      Review of Systems   Constitutional:  Negative for chills and fever.   Musculoskeletal:  Positive for back pain.   Skin:  Negative for rash.   Neurological:  Negative for sensory change and focal weakness.     No Known Allergies     Objective:   /78 (BP Location: Right arm, Patient Position: Sitting)   Pulse 84   Temp 36.7 °C (98 °F) (Temporal)   Resp 18   Ht 1.778 m (5' 10\")   Wt 88.9 kg (196 lb)   SpO2 96%   BMI 28.12 kg/m²     Physical Exam  Vitals and nursing note reviewed.   Constitutional:       General: He is not in acute distress.     Appearance: Normal appearance. He is well-developed. He is not diaphoretic.   HENT:      Head: Normocephalic and atraumatic.      Right Ear: External ear normal.      Left Ear: External ear normal.      Nose: Nose normal.   Eyes:      General: No scleral icterus.        Right eye: No discharge.         Left eye: No discharge.      Conjunctiva/sclera: Conjunctivae normal.   Pulmonary:      Effort: Pulmonary effort is normal. No respiratory distress.   Musculoskeletal:         General: Normal range of motion.      Cervical back: Neck supple.      Lumbar back: Spasms (bilat paraspinal), " tenderness (lumbar spine ttp, grossly normal) and bony tenderness present. No swelling, edema, deformity, signs of trauma or lacerations. Negative right straight leg raise test and negative left straight leg raise test. No scoliosis.   Skin:     General: Skin is warm and dry.      Coloration: Skin is not pale.   Neurological:      Mental Status: He is alert and oriented to person, place, and time. He is not disoriented.      Sensory: No sensory deficit.      Coordination: Coordination normal.      Deep Tendon Reflexes: Reflexes are normal and symmetric.      Comments: SLR normal bilat     Toradol 30 mg IM-tolerates well    Assessment/Plan:   1. Sciatica of right side  - ketorolac (TORADOL) injection 30 mg  - Referral to Orthopedics  - cyclobenzaprine (FLEXERIL) 10 mg Tab; Take 1 Tablet by mouth every 8 hours as needed for Moderate Pain or Muscle Spasms.  Dispense: 30 Tablet; Refill: 0  - naproxen (NAPROSYN) 500 MG Tab; Take 1 Tablet by mouth 2 times a day with meals.  Dispense: 60 Tablet; Refill: 1    Recommend conservative care, rest, ice, heat, work on gentle ROM exercises  Cautioned regarding potential for sedation with medication.  Rx naproxen, no NSAIDs while taking  Return to clinic with lack of resolution or progression of symptoms.  F/u with orthopedics with persistent    I have worn an N95 mask, gloves and eye protection for the entire encounter with this patient.     Differential diagnosis, natural history, supportive care, and indications for immediate follow-up discussed.

## 2022-11-04 NOTE — TELEPHONE ENCOUNTER
Is the patient due for a refill? No- requesting a change in pharmacy    Was the patient seen the past year? No    Date of last office visit: 8/30/2021    Does the patient have an upcoming appointment?  Yes   If yes, When? 12/6/2022    Provider to refill:    Does the patients insurance require a 100 day supply?  No

## 2022-11-07 ENCOUNTER — PATIENT MESSAGE (OUTPATIENT)
Dept: CARDIOLOGY | Facility: MEDICAL CENTER | Age: 47
End: 2022-11-07
Payer: COMMERCIAL

## 2022-11-07 DIAGNOSIS — E78.5 HYPERLIPIDEMIA, UNSPECIFIED HYPERLIPIDEMIA TYPE: ICD-10-CM

## 2022-11-07 RX ORDER — ROSUVASTATIN CALCIUM 40 MG/1
40 TABLET, COATED ORAL DAILY
Qty: 90 TABLET | Refills: 3 | Status: SHIPPED | OUTPATIENT
Start: 2022-11-07 | End: 2022-12-06 | Stop reason: SDUPTHER

## 2022-11-09 NOTE — PATIENT COMMUNICATION
Labs ordered, lab slips printed, and mailed to pt mailing address.    Replied to pt via [employer] regarding findings.

## 2022-11-10 ENCOUNTER — PATIENT MESSAGE (OUTPATIENT)
Dept: CARDIOLOGY | Facility: MEDICAL CENTER | Age: 47
End: 2022-11-10
Payer: COMMERCIAL

## 2022-11-17 ENCOUNTER — HOSPITAL ENCOUNTER (OUTPATIENT)
Dept: LAB | Facility: MEDICAL CENTER | Age: 47
End: 2022-11-17
Attending: NURSE PRACTITIONER
Payer: COMMERCIAL

## 2022-11-17 DIAGNOSIS — E78.5 HYPERLIPIDEMIA, UNSPECIFIED HYPERLIPIDEMIA TYPE: ICD-10-CM

## 2022-11-17 LAB
ALBUMIN SERPL BCP-MCNC: 4.8 G/DL (ref 3.2–4.9)
ALBUMIN/GLOB SERPL: 1.6 G/DL
ALP SERPL-CCNC: 51 U/L (ref 30–99)
ALT SERPL-CCNC: 56 U/L (ref 2–50)
ANION GAP SERPL CALC-SCNC: 10 MMOL/L (ref 7–16)
AST SERPL-CCNC: 32 U/L (ref 12–45)
BILIRUB SERPL-MCNC: 0.5 MG/DL (ref 0.1–1.5)
BUN SERPL-MCNC: 26 MG/DL (ref 8–22)
CALCIUM SERPL-MCNC: 9.8 MG/DL (ref 8.5–10.5)
CHLORIDE SERPL-SCNC: 101 MMOL/L (ref 96–112)
CHOLEST SERPL-MCNC: 153 MG/DL (ref 100–199)
CO2 SERPL-SCNC: 27 MMOL/L (ref 20–33)
CREAT SERPL-MCNC: 0.91 MG/DL (ref 0.5–1.4)
FASTING STATUS PATIENT QL REPORTED: NORMAL
GFR SERPLBLD CREATININE-BSD FMLA CKD-EPI: 104 ML/MIN/1.73 M 2
GLOBULIN SER CALC-MCNC: 3 G/DL (ref 1.9–3.5)
GLUCOSE SERPL-MCNC: 70 MG/DL (ref 65–99)
HDLC SERPL-MCNC: 48 MG/DL
LDLC SERPL CALC-MCNC: 95 MG/DL
POTASSIUM SERPL-SCNC: 4.5 MMOL/L (ref 3.6–5.5)
PROT SERPL-MCNC: 7.8 G/DL (ref 6–8.2)
SODIUM SERPL-SCNC: 138 MMOL/L (ref 135–145)
TRIGL SERPL-MCNC: 50 MG/DL (ref 0–149)

## 2022-11-17 PROCEDURE — 80061 LIPID PANEL: CPT

## 2022-11-17 PROCEDURE — 36415 COLL VENOUS BLD VENIPUNCTURE: CPT

## 2022-11-17 PROCEDURE — 80053 COMPREHEN METABOLIC PANEL: CPT

## 2022-12-06 ENCOUNTER — OFFICE VISIT (OUTPATIENT)
Dept: CARDIOLOGY | Facility: MEDICAL CENTER | Age: 47
End: 2022-12-06
Payer: COMMERCIAL

## 2022-12-06 VITALS
DIASTOLIC BLOOD PRESSURE: 80 MMHG | HEIGHT: 70 IN | BODY MASS INDEX: 27.49 KG/M2 | HEART RATE: 60 BPM | WEIGHT: 192 LBS | RESPIRATION RATE: 14 BRPM | SYSTOLIC BLOOD PRESSURE: 110 MMHG | OXYGEN SATURATION: 99 %

## 2022-12-06 DIAGNOSIS — I25.118 CORONARY ARTERY DISEASE OF NATIVE ARTERY OF NATIVE HEART WITH STABLE ANGINA PECTORIS (HCC): ICD-10-CM

## 2022-12-06 DIAGNOSIS — E78.5 HYPERLIPIDEMIA, UNSPECIFIED HYPERLIPIDEMIA TYPE: ICD-10-CM

## 2022-12-06 DIAGNOSIS — E78.00 PURE HYPERCHOLESTEROLEMIA: ICD-10-CM

## 2022-12-06 PROCEDURE — 99214 OFFICE O/P EST MOD 30 MIN: CPT | Performed by: NURSE PRACTITIONER

## 2022-12-06 RX ORDER — ROSUVASTATIN CALCIUM 40 MG/1
40 TABLET, COATED ORAL DAILY
Qty: 90 TABLET | Refills: 3 | Status: SHIPPED | OUTPATIENT
Start: 2022-12-06 | End: 2023-07-13 | Stop reason: SDUPTHER

## 2022-12-06 NOTE — PROGRESS NOTES
Cardiology Clinic Follow-up Note    Date of note:    12/6/2022  Primary Care Provider: Amie Tolbert M.D.    Name:             Konstantin Cheung  YOB: 1975  MRN:               [id number]    CC: CAD, 3 stents to mid LAD in 2019    Primary Cardiologist: Dr. Farley    Patient HPI:   Konstantin Cheung is a 46 y.o. male with PMH including CAD, s/p PCI with overlapping ANN x3 to LAD in 8/2019, family history premature cardiac disease, and HLD.     Interim History:  Patient presents today for yearly follow-up.  Only took ranolazine for about 1 week last year did not notice much improvement, prefers the least amount of medications possible.  Maintains exercise with biking and kickboxing, does not feel that he reaches a point where he gets a little short of breath but contributes this to not being as young as he once was.     Recent LDL above goal at 98.  Eating very heart healthy diet and maintaining normal weight with regular exercise.    He denies palpitations, chest pain, shortness of breath, dyspnea on exertion, dizziness or syncopal episodes, orthopnea, PND, lower extremity swelling, and recent weight gain.     Per my last office visit note on 8/30/21  Mr. Cheung was last seen in this cardiology office by Dr. Farley in 9/2020 and then by myself on 7/27/21 with increasing chest pain while riding his stationary bike.  It was discussed with Dr. Farley and decision was made to trial ranolazine 500 mg twice daily and schedule for nuc med stress test, unfortunately he has not been able to schedule the Nuc Med stress test    Since last appointment 1 month ago he has been feeling much better with taking ranolazine.  He has not attempted to start exercising yet however.  He continues to carry nitro SL in his pocket.    Patient endorses medication compliance. He denies palpitations, chest pain, shortness of breath, dyspnea on exertion, dizziness or syncopal episodes, orthopnea, PND, lower  extremity swelling, and recent weight gain.     He does not have a PCP.      Cardiovascular Risk Factors:  1. Smoking status: No  2. Type II Diabetes Mellitus: no  3. Hypertension: no  4. Dyslipidemia: Yes, LDL 68 on 7/27/21  5. Family history of early Coronary Artery Disease in a first degree relative (Male less than 55 years of age; Female less than 65 years of age): yes (Father had CABG at young age)  6. Obesity and/or Metabolic Syndrome: BMI 26.7  7. Sedentary lifestyle: very active    Review of systems:  All others systems reviewed and negative except for what is outlined in the above HPI    Past Medical History:   Diagnosis Date    CAD (coronary artery disease)      Past Surgical History:   Procedure Laterality Date    ANGIOPLASTY       History reviewed. No pertinent family history.  Social History     Socioeconomic History    Marital status:      Spouse name: Not on file    Number of children: Not on file    Years of education: Not on file    Highest education level: Not on file   Occupational History    Not on file   Tobacco Use    Smoking status: Never    Smokeless tobacco: Never   Vaping Use    Vaping Use: Never used   Substance and Sexual Activity    Alcohol use: Yes     Comment: rare    Drug use: Yes     Comment: CBD    Sexual activity: Not on file   Other Topics Concern    Not on file   Social History Narrative    Not on file     Social Determinants of Health     Financial Resource Strain: Not on file   Food Insecurity: Not on file   Transportation Needs: Not on file   Physical Activity: Not on file   Stress: Not on file   Social Connections: Not on file   Intimate Partner Violence: Not on file   Housing Stability: Not on file     No Known Allergies  Current Outpatient Medications   Medication Sig Dispense Refill    METHYLPREDNISOLONE PO Take 4 mg by mouth every day. Alternates dosages (currently taking 3 tabs today)      rosuvastatin (CRESTOR) 40 MG tablet Take 1 Tablet by mouth every day. 90  "Tablet 3    aspirin EC (ECOTRIN) 81 MG Tablet Delayed Response Take 1 Tab by mouth every day. 90 Tab 3     No current facility-administered medications for this visit.       Physical Exam:  Ambulatory Vitals  /80 (BP Location: Left arm, Patient Position: Sitting, BP Cuff Size: Adult)   Pulse 60   Resp 14   Ht 1.778 m (5' 10\")   Wt 87.1 kg (192 lb)   SpO2 99%    BP Readings from Last 4 Encounters:   12/06/22 110/80   11/03/22 116/78   08/30/21 110/76   07/27/21 104/80       Weight/BMI: Body mass index is 27.55 kg/m².  Wt Readings from Last 4 Encounters:   12/06/22 87.1 kg (192 lb)   11/03/22 88.9 kg (196 lb)   08/30/21 84.8 kg (187 lb)   07/27/21 84.5 kg (186 lb 3.2 oz)       General: No apparent distress. Well nourished.   Neck: No JVD. No caroid bruits, trachea midline  Lungs: CTAB. Normal effort, without crackles/rhonchi, no wheezing  Heart: Bradycardia. Normal S1/S2, no murmur, no rub. no lower extremity edema. 2+ radial pulses, 2+ DT pulses  Ext: No clubbing or cyanosis.  Abdomen: soft, non tender, non distended, no charli hepatomegaly.  Neurological: No focal deficits, no facial asymmetry.  Normal speech.  Psychiatric: Appropriate affect, alert and oriented x 4.   Skin: Warm and dry, no rash.    Lab Data Review:  Lab Results   Component Value Date/Time    CHOLSTRLTOT 153 11/17/2022 11:01 AM    LDL 95 11/17/2022 11:01 AM    HDL 48 11/17/2022 11:01 AM    TRIGLYCERIDE 50 11/17/2022 11:01 AM       Lab Results   Component Value Date/Time    SODIUM 138 11/17/2022 11:01 AM    POTASSIUM 4.5 11/17/2022 11:01 AM    CHLORIDE 101 11/17/2022 11:01 AM    CO2 27 11/17/2022 11:01 AM    GLUCOSE 70 11/17/2022 11:01 AM    BUN 26 (H) 11/17/2022 11:01 AM    CREATININE 0.91 11/17/2022 11:01 AM     Lab Results   Component Value Date/Time    ALKPHOSPHAT 51 11/17/2022 11:01 AM    ASTSGOT 32 11/17/2022 11:01 AM    ALTSGPT 56 (H) 11/17/2022 11:01 AM    TBILIRUBIN 0.5 11/17/2022 11:01 AM      No results found for: WBC    Cardiac " Imaging and Procedures Review:      EKG 7/27/21: My Personal interpretation reveals SB 52, slight ST elevation in V2     (Following imagining reviewed through Care Everywhere, performed at [facility])  Echocardiogram (8/5/2019):  Findings:   Mild left atrial enlargement. Otherwise normal cardiac chamber sizes.   Nl left ventricular wall thickness with grossly normal LV systolic   function. Estimated left ventricular ejection fraction = 55-60%. Cannot   exclude very mild distal anteroseptal LV wall hypokinesis.   Diastolic indices are indeterminant of LV diastolic function.   Normal RV systolic function.   Nl cardiac valve structures.   Trace MR. Trace TR.   The right ventricular systolic pressure could not be estimated due to   insufficient tricuspid regurgitation signal.   The inferior vena cava is normal in size and response to respiration,   consistent with normal right-sided filling pressures.   No previous study.     Echo 10/18/21  CONCLUSIONS  Normal left ventricular systolic function.  The left ventricular ejection fraction is visually estimated to be 55%.  The right ventricle is normal in size and systolic function.  No prior study is available for comparison.     Cardiac CTA (8/2/2019):  1. 1 vessel obstructive coronary artery disease, with 100% occlusion of mid LAD.  2.  This is a right dominant coronary system.  3.  Coronary calcium score: (Agatston):29.(80 %)     TMST (6/14/2019):  Patient exercised on accelerated Jose protocol to a workload of 14.0 mets.    Symptom-limited treadmill test, stopped due to fatigue.   Normal heart rate response to exercise, with resting heart rate of 64 bpm  and  peak heart rate of 157 bpm, which was 89% of maximum predicted heart rate for age.  Normal blood pressure response to exercise, BP heidi from 120/92 at rest to peak of 159/70 mm Hg.  Pt had gradual incrased mid chest discomfort started 2/10 to 6/10 at peak of exercise and there was no EKG  abnormalities with exercise.  No arrhythmias seen.  Advise pt to follow up with Dr. Vick for persistent chest discomfort.   CONCLUSION:  Negative for ischemia at high workload.   Average exercise capacity for age.      [facility] 8/6/19  Dominance: Right   Left main:   Large caliber, bifurcates to the left anterior descending (LAD) and left   circumflex (LCx) arteries.   Ostial: 10%. Mid: 10%. Distal: 10%.   LAD:   A large caliber vessel, wraps around the LV apex; it is 100% occluded in   the mid segment. There is retrograde   Prox: 10%. Mid: 100%.   LCx:   A large caliber, vessel; there are two obtuse marginal branches.   Prox: 10%. Mid: 10%. Distal: 30%.   OM1 has a proximal 60% lesion.   RCA:   A large caliber, vessel which gives rise to large caliber posterior   descending artery (PDA) and large caliber posterolateral vessel (PLV).   Prox: 20%. Mid: 30%. Distal: 20%.   The PDA supplies retrograde collaterals to the LAD and mid to distal   reconstitution is seen.   PCI procedure note:   100% lesion of mid LAD treated with overlapping 3.0x12, 2.5x38mm, 2.25x   24mm Synergy ANN, post dilated with a 3.25mmNC balloon proximally and a   mid to distal 2.5mm NC balloon, converting it to 0% residual stenosis.   Final angiography revealed an excellent result, notable for 0% residual   stenosis, LIZ III flow and no evidence of dissection or perforation.     Femoral angiogram:   right femoral angiography revealed arterial access at the mid femoral   head, above the femoral bifurcation and below the origin of the inferior   epigastric artery     Assessment:   1) Severe mid vessel LAD coronary artery disease.   2) Successful stenting of the mid LAD  lesion with overlapping 3.0x12,   2.5x38mm, 2.25x 24mm Synergy ANN, post dilated with a 3.25mmNC balloon   proximally and a mid to distal 2.5mm NC balloon, reducing a 100% stenosis   to 0%.     NM Cardiac stress test 10/5/21  NUCLEAR IMAGING INTERPRETATION   No evidence of  significant jeopardized viable myocardium or prior myocardial    infarction.   Normal left ventricular size, ejection fraction, and wall motion.   Non-diagnostic st changes at peak stress.    Phelan treadmill score of +5.5, indicating this is a low risk study.   ECG INTERPRETATION   Non-diagnostic st changes at peak stress.      Assessment and Clinical Decision Makin. Pure hypercholesterolemia  Lipid Profile    Comp Metabolic Panel      2. Hyperlipidemia, unspecified hyperlipidemia type  rosuvastatin (CRESTOR) 40 MG tablet    Comp Metabolic Panel      3. Coronary artery disease of native artery of native heart with stable angina pectoris (HCC)            The following treatment plan was discussed    Hyperlipidemia  -Continue high intensity statin, Crestor 40mg  -LDL 98 above goal of < 70  -Consider addition of ezetimibe or referral to lipid clinic to discuss [id number]  -recheck in 2 months, discussed lifestyle changes    CAD with stable angina pectoris  -No recurrence of angina while off ranolazine  -Nuc Med stress test in  with no evidence of ischemia  -We discussed s/sx of ACS, when to seek emergent care    Hx of overlapping ANN x3 to mid LAD   -Continues on aspirin 81 mg daily  -Due to hypotension unable to tolerate metoprolol and lisinopril  -BP's continue to be low 100's/60's  -Echocardiogram in  with normal EF    Plan reviewed in detail with the patient, verbalizes understanding and is in agreement.  Pt is to follow up with myself in 1 year     Encouraged Pt to follow up with us over the phone or electronically using my G.I. Windowst as cardiac issues/concerns arise.    PLEASE NOTE: This dictation was created using voice recognition software. I have made every reasonable attempt to correct obvious errors, but I expect that there are errors of grammar and possibly content that I did not discover before finalizing the note.       KADIE Garcia.   [facility]  [phone]  [phone]    Collaborating Physician: Dr. Mayberry

## 2022-12-06 NOTE — PATIENT INSTRUCTIONS
[employer] - great resource for American College of Cardiology on heart disease prevention and treatment    FOR TREATMENT OR PREVENTION OF CORONARY ARTERY DISEASE  These three programs are approved by Medicare/Insurers for those with heart disease  [place] [facility]  [facility] for Reversing Heart Disease - Taye Thomass Cardiologist vegetarian-based  [facility] - [place]-based mind-body Program    This is a commonly referenced Program  Dr Daniels - [place] [facility] [place] (book and documentary) - vegetarian-based    FOR TREATMENT OF BLOOD PRESSURE  DASH DIET - American Heart Association for treatment of HYPERTENSION    FOR TREATMENT OF BAD CHOLESTEROL/FATS  REDUCE PROCESSED SUGAR AS MUCH AS POSSIBLE  INCREASE WHOLE GRAINS/VEGETABLES  INCREASE FIBER    Lowering total cholesterol and LDL (bad) cholesterol:  - Eat leaner cuts of meat, or eliminate altogether if possible red meat, and frequently substitute fish or chicken.  - Limit saturated fat to no more than 7-10% of total calories no more than 10 g per day is recommended. Some sources of saturated fat include butter, animal fats, hydrogenated vegetable fats and oils, many desserts, whole milk dairy products.  - Replaced saturated fats with polyunsaturated fats and monounsaturated fats. Foods high in monounsaturated fat include nuts, canola oil, avocados, and olives.  - Limit trans fat (processed foods) and replaced with fresh fruits and vegetables  - Recommend nonfat dairy products  - Increase substantially the amount of soluble fiber intake (legumes such as beans, fruit, whole grains).  - Consider nutritional supplements: plant sterile spreads such as Benecol, fish oil,  flaxseed oil, omega-3 acids capsules 1000 mg twice a day, or viscous fiber such as Metamucil  - Attain ideal weight and regular exercise (at least 30 minutes per day of moderate exercise)  ASK ABOUT STATIN OR NON STATIN  MEDICATION TO REDUCE YOUR LDL AND HEART RISK    Lowering triglycerides:  - Reduce intake of simple sugar: Desserts, candy, pastries, honey, sodas, sugared cereals, yogurt, Gatorade, sports bars, canned fruit, smoothies, fruit juice, coffee drinks  - Reduced intake of refined starches: Refined Pasta, most bread  - Reduce or abstain from alcohol  - Increase omega-3 fatty acids: [place], Trout, Mackerel, Herring, [place] tuna and supplements  - Attain ideal weight and regular exercise (at least 30 minutes per day of moderate exercise)  ASK ABOUT PURIFIED OMEGA 3 EPA or FISH OIL TO REDUCE YOUR TG AND HEART RISK    Elevating HDL (good) cholesterol:  - Increase physical activity  - Increase omega-3 fatty acids and supplements as listed above  - Incorporating appropriate amounts of monounsaturated fats such as nuts, olive oil, canola oil, avocados, olives  - Stop smoking  - Attain ideal weight and regular exercise (at least 30 minutes per day of moderate exercise)        Zetia- pill to help lower cholesterol  Praluent and Repatha are injectable drugs that help lower cholesterol.

## 2023-06-26 ENCOUNTER — PATIENT MESSAGE (OUTPATIENT)
Dept: CARDIOLOGY | Facility: MEDICAL CENTER | Age: 48
End: 2023-06-26
Payer: COMMERCIAL

## 2023-06-27 ENCOUNTER — PATIENT MESSAGE (OUTPATIENT)
Dept: CARDIOLOGY | Facility: MEDICAL CENTER | Age: 48
End: 2023-06-27
Payer: COMMERCIAL

## 2023-06-27 NOTE — PATIENT COMMUNICATION
Patient called and scheduled for EKG tomorrow for further assessment. Strong ER precautions provided for any changes or worsening in symptoms. Patient states that he has been through this before and knows when he needs to go to the ER.       To MR: Please see Liv stein and SAI EKG scheduled for tomorrow with FV scheduled next week. ER precautions given to patient multiple times.

## 2023-06-28 ENCOUNTER — TELEPHONE (OUTPATIENT)
Dept: CARDIOLOGY | Facility: MEDICAL CENTER | Age: 48
End: 2023-06-28

## 2023-06-28 ENCOUNTER — NON-PROVIDER VISIT (OUTPATIENT)
Dept: CARDIOLOGY | Facility: MEDICAL CENTER | Age: 48
End: 2023-06-28
Attending: NURSE PRACTITIONER
Payer: COMMERCIAL

## 2023-06-28 DIAGNOSIS — R07.89 CHEST PRESSURE: ICD-10-CM

## 2023-06-28 PROCEDURE — 93005 ELECTROCARDIOGRAM TRACING: CPT

## 2023-06-28 NOTE — PROGRESS NOTES
Patient was here today for EKG per MR.     Patient reports having intermittent chest pressure. Patient is not sure if it is related but there is a spot on his R shin that has little sensation at times and lots of pain at others.     EKG performed and transferred into patients chart. Paper copy of EKG given to Gema DUMONT for MR. Patient has been informed that Gema DUMONT, will be in shortly to speak about EKG.

## 2023-06-28 NOTE — PROGRESS NOTES
MR: GIOVANNY w/ you next week 07/07/23  I told patient to speak w/ you regarding below at visit.  =====  Spoke with patient who expressed concerns regarding intermittent chest pressure that is similar to what he experienced prior to receiving his stents 4 years ago. Pt's chest discomfort is subtle, not a 10 but occurs randomly. Pt has stopped exercising for the time being out of precaution.  Pt has new right lateral shin pain as well that has slight bruising on appearance that he said radiates downward when bending at the knee. Pt stated it developed recently.

## 2023-06-29 LAB — EKG IMPRESSION: NORMAL

## 2023-06-29 PROCEDURE — 93010 ELECTROCARDIOGRAM REPORT: CPT | Performed by: INTERNAL MEDICINE

## 2023-07-03 ENCOUNTER — HOSPITAL ENCOUNTER (OUTPATIENT)
Dept: LAB | Facility: MEDICAL CENTER | Age: 48
End: 2023-07-03
Attending: NURSE PRACTITIONER
Payer: COMMERCIAL

## 2023-07-03 DIAGNOSIS — E78.00 PURE HYPERCHOLESTEROLEMIA: ICD-10-CM

## 2023-07-03 DIAGNOSIS — E78.5 HYPERLIPIDEMIA, UNSPECIFIED HYPERLIPIDEMIA TYPE: ICD-10-CM

## 2023-07-03 LAB
ALBUMIN SERPL BCP-MCNC: 4.5 G/DL (ref 3.2–4.9)
ALBUMIN/GLOB SERPL: 1.6 G/DL
ALP SERPL-CCNC: 55 U/L (ref 30–99)
ALT SERPL-CCNC: 32 U/L (ref 2–50)
ANION GAP SERPL CALC-SCNC: 10 MMOL/L (ref 7–16)
AST SERPL-CCNC: 22 U/L (ref 12–45)
BILIRUB SERPL-MCNC: 0.5 MG/DL (ref 0.1–1.5)
BUN SERPL-MCNC: 18 MG/DL (ref 8–22)
CALCIUM ALBUM COR SERPL-MCNC: 9.1 MG/DL (ref 8.5–10.5)
CALCIUM SERPL-MCNC: 9.5 MG/DL (ref 8.5–10.5)
CHLORIDE SERPL-SCNC: 103 MMOL/L (ref 96–112)
CHOLEST SERPL-MCNC: 135 MG/DL (ref 100–199)
CO2 SERPL-SCNC: 26 MMOL/L (ref 20–33)
CREAT SERPL-MCNC: 0.84 MG/DL (ref 0.5–1.4)
FASTING STATUS PATIENT QL REPORTED: NORMAL
GFR SERPLBLD CREATININE-BSD FMLA CKD-EPI: 107 ML/MIN/1.73 M 2
GLOBULIN SER CALC-MCNC: 2.8 G/DL (ref 1.9–3.5)
GLUCOSE SERPL-MCNC: 83 MG/DL (ref 65–99)
HDLC SERPL-MCNC: 52 MG/DL
LDLC SERPL CALC-MCNC: 75 MG/DL
POTASSIUM SERPL-SCNC: 4.2 MMOL/L (ref 3.6–5.5)
PROT SERPL-MCNC: 7.3 G/DL (ref 6–8.2)
SODIUM SERPL-SCNC: 139 MMOL/L (ref 135–145)
TRIGL SERPL-MCNC: 41 MG/DL (ref 0–149)

## 2023-07-03 PROCEDURE — 80061 LIPID PANEL: CPT

## 2023-07-03 PROCEDURE — 36415 COLL VENOUS BLD VENIPUNCTURE: CPT

## 2023-07-03 PROCEDURE — 80053 COMPREHEN METABOLIC PANEL: CPT

## 2023-07-05 ENCOUNTER — TELEPHONE (OUTPATIENT)
Dept: CARDIOLOGY | Facility: MEDICAL CENTER | Age: 48
End: 2023-07-05

## 2023-07-05 ENCOUNTER — OFFICE VISIT (OUTPATIENT)
Dept: CARDIOLOGY | Facility: MEDICAL CENTER | Age: 48
End: 2023-07-05
Attending: NURSE PRACTITIONER
Payer: COMMERCIAL

## 2023-07-05 VITALS
HEIGHT: 70 IN | OXYGEN SATURATION: 93 % | HEART RATE: 60 BPM | WEIGHT: 190 LBS | BODY MASS INDEX: 27.2 KG/M2 | DIASTOLIC BLOOD PRESSURE: 70 MMHG | SYSTOLIC BLOOD PRESSURE: 112 MMHG | RESPIRATION RATE: 16 BRPM

## 2023-07-05 DIAGNOSIS — I25.118 CORONARY ARTERY DISEASE OF NATIVE ARTERY OF NATIVE HEART WITH STABLE ANGINA PECTORIS (HCC): ICD-10-CM

## 2023-07-05 DIAGNOSIS — E78.2 MIXED HYPERLIPIDEMIA: ICD-10-CM

## 2023-07-05 DIAGNOSIS — I20.89 STABLE ANGINA (HCC): ICD-10-CM

## 2023-07-05 PROCEDURE — 99211 OFF/OP EST MAY X REQ PHY/QHP: CPT | Performed by: NURSE PRACTITIONER

## 2023-07-05 PROCEDURE — 3078F DIAST BP <80 MM HG: CPT | Performed by: NURSE PRACTITIONER

## 2023-07-05 PROCEDURE — 3074F SYST BP LT 130 MM HG: CPT | Performed by: NURSE PRACTITIONER

## 2023-07-05 PROCEDURE — 99214 OFFICE O/P EST MOD 30 MIN: CPT | Performed by: NURSE PRACTITIONER

## 2023-07-05 RX ORDER — NITROGLYCERIN 0.4 MG/1
0.4 TABLET SUBLINGUAL PRN
Qty: 25 TABLET | Refills: 0 | Status: SHIPPED
Start: 2023-07-05 | End: 2023-07-13 | Stop reason: SDUPTHER

## 2023-07-05 RX ORDER — EZETIMIBE 10 MG/1
10 TABLET ORAL DAILY
Qty: 30 TABLET | Refills: 6 | Status: SHIPPED
Start: 2023-07-05 | End: 2023-07-13 | Stop reason: SDUPTHER

## 2023-07-05 RX ORDER — METOPROLOL SUCCINATE 25 MG/1
25 TABLET, EXTENDED RELEASE ORAL NIGHTLY
Qty: 30 TABLET | Refills: 3 | Status: SHIPPED
Start: 2023-07-05 | End: 2023-07-13 | Stop reason: SDUPTHER

## 2023-07-05 NOTE — TELEPHONE ENCOUNTER
Patient scheduled for C w/poss on 7-31-23 with Dr. Moya. Patient has been instructed to check in at 6:00 for 7:30 case time. Message sent to authorizations.

## 2023-07-05 NOTE — PROGRESS NOTES
Cardiology Clinic Follow-up Note    Date of note:    7/5/2023  Primary Care Provider: Amie Tolbert M.D.    Name:             Konstantin Cheung  YOB: 1975  MRN:               [id number]    CC: CAD, 3 stents to mid LAD in 2019    Primary Cardiologist: Dr. Farley    Patient HPI:   Konstantin Cheung is a 48 y.o. male with PMH including CAD, s/p PCI with overlapping ANN x3 to LAD in 8/2019, family history premature cardiac disease, and HLD.     Interim History:  Patient presents today after recurrent episodes of chest pain and shortness of breath with exertion. He is usually very active but has been more reserved now given his cardiac history and recent chest pain episodes.  Blood work was ordered to include CMP and lipid panel, LDL unfortunately was at 75, EKG was obtained in the office on 6/28/32 that was normal per patient's baseline EKG. He has not needed to take any nitroglycerin as episodes have diminished with rest, however he is very stressed given that these chest pain episodes feel very similar to what he felt prior to his 100% occlusion to the mid LAD in 2019.  He denies palpitations, dyspnea on exertion, dizziness or syncopal episodes, orthopnea, PND, lower extremity swelling, and recent weight gain.     Per my last office visit note on 12/6/2022  Patient presents today for yearly follow-up.  Only took ranolazine for about 1 week last year did not notice much improvement, prefers the least amount of medications possible.  Maintains exercise with biking and kickboxing, does not feel that he reaches a point where he gets a little short of breath but contributes this to not being as young as he once was.     Recent LDL above goal at 98.  Eating very heart healthy diet and maintaining normal weight with regular exercise.    He denies palpitations, chest pain, shortness of breath, dyspnea on exertion, dizziness or syncopal episodes, orthopnea, PND, lower extremity swelling, and recent  weight gain.     Per my last office visit note on 8/30/21  Mr. Cheung was last seen in this cardiology office by Dr. Farley in 9/2020 and then by myself on 7/27/21 with increasing chest pain while riding his stationary bike.  It was discussed with Dr. Farley and decision was made to trial ranolazine 500 mg twice daily and schedule for nuc med stress test, unfortunately he has not been able to schedule the Nuc Med stress test    Since last appointment 1 month ago he has been feeling much better with taking ranolazine.  He has not attempted to start exercising yet however.  He continues to carry nitro SL in his pocket.    Patient endorses medication compliance. He denies palpitations, chest pain, shortness of breath, dyspnea on exertion, dizziness or syncopal episodes, orthopnea, PND, lower extremity swelling, and recent weight gain.     He does not have a PCP.      Cardiovascular Risk Factors:  1. Smoking status: No  2. Type II Diabetes Mellitus: no  3. Hypertension: no  4. Dyslipidemia: Yes, LDL 68 on 7/27/21  5. Family history of early Coronary Artery Disease in a first degree relative (Male less than 55 years of age; Female less than 65 years of age): yes (Father had CABG at young age)  6. Obesity and/or Metabolic Syndrome: BMI 26.7  7. Sedentary lifestyle: very active    Review of systems:  All others systems reviewed and negative except for what is outlined in the above HPI    Past Medical History:   Diagnosis Date    CAD (coronary artery disease)      Past Surgical History:   Procedure Laterality Date    ANGIOPLASTY       History reviewed. No pertinent family history.  Social History     Socioeconomic History    Marital status:      Spouse name: Not on file    Number of children: Not on file    Years of education: Not on file    Highest education level: Not on file   Occupational History    Not on file   Tobacco Use    Smoking status: Never    Smokeless tobacco: Never   Vaping Use    Vaping  "Use: Never used   Substance and Sexual Activity    Alcohol use: Yes     Comment: rare    Drug use: Yes     Comment: CBD,THC    Sexual activity: Not on file   Other Topics Concern    Not on file   Social History Narrative    Not on file     Social Determinants of Health     Financial Resource Strain: Not on file   Food Insecurity: Not on file   Transportation Needs: Not on file   Physical Activity: Not on file   Stress: Not on file   Social Connections: Not on file   Intimate Partner Violence: Not on file   Housing Stability: Not on file     No Known Allergies  Current Outpatient Medications   Medication Sig Dispense Refill    rosuvastatin (CRESTOR) 40 MG tablet Take 1 Tablet by mouth every day. 90 Tablet 3    aspirin EC (ECOTRIN) 81 MG Tablet Delayed Response Take 1 Tab by mouth every day. 90 Tab 3    METHYLPREDNISOLONE PO Take 4 mg by mouth every day. Alternates dosages (currently taking 3 tabs today)       No current facility-administered medications for this visit.       Physical Exam:  Ambulatory Vitals  /70 (BP Location: Left arm, Patient Position: Sitting, BP Cuff Size: Adult)   Pulse 60   Resp 16   Ht 1.778 m (5' 10\")   Wt 86.2 kg (190 lb)   SpO2 93%    BP Readings from Last 4 Encounters:   07/05/23 112/70   12/06/22 110/80   11/03/22 116/78   08/30/21 110/76       Weight/BMI: Body mass index is 27.26 kg/m².  Wt Readings from Last 4 Encounters:   07/05/23 86.2 kg (190 lb)   12/06/22 87.1 kg (192 lb)   11/03/22 88.9 kg (196 lb)   08/30/21 84.8 kg (187 lb)       General: No apparent distress. Well nourished.   Neck: No JVD. No caroid bruits, trachea midline  Lungs: CTAB. Normal effort, without crackles/rhonchi, no wheezing  Heart: Bradycardia. Normal S1/S2, no murmur, no rub. no lower extremity edema. 2+ radial pulses, 2+ DT pulses  Ext: No clubbing or cyanosis.  Abdomen: soft, non tender, non distended, no charli hepatomegaly.  Neurological: No focal deficits, no facial asymmetry.  Normal " speech.  Psychiatric: Appropriate affect, alert and oriented x 4.   Skin: Warm and dry, no rash.    Lab Data Review:  Lab Results   Component Value Date/Time    CHOLSTRLTOT 135 07/03/2023 09:58 AM    LDL 75 07/03/2023 09:58 AM    HDL 52 07/03/2023 09:58 AM    TRIGLYCERIDE 41 07/03/2023 09:58 AM       Lab Results   Component Value Date/Time    SODIUM 139 07/03/2023 09:58 AM    POTASSIUM 4.2 07/03/2023 09:58 AM    CHLORIDE 103 07/03/2023 09:58 AM    CO2 26 07/03/2023 09:58 AM    GLUCOSE 83 07/03/2023 09:58 AM    BUN 18 07/03/2023 09:58 AM    CREATININE 0.84 07/03/2023 09:58 AM     Lab Results   Component Value Date/Time    ALKPHOSPHAT 55 07/03/2023 09:58 AM    ASTSGOT 22 07/03/2023 09:58 AM    ALTSGPT 32 07/03/2023 09:58 AM    TBILIRUBIN 0.5 07/03/2023 09:58 AM      No results found for: WBC    Cardiac Imaging and Procedures Review:      EKG 7/27/21: My Personal interpretation reveals SB 52, slight ST elevation in V2     (Following imagining reviewed through Care Everywhere, performed at [facility])  Echocardiogram (8/5/2019):  Findings:   Mild left atrial enlargement. Otherwise normal cardiac chamber sizes.   Nl left ventricular wall thickness with grossly normal LV systolic   function. Estimated left ventricular ejection fraction = 55-60%. Cannot   exclude very mild distal anteroseptal LV wall hypokinesis.   Diastolic indices are indeterminant of LV diastolic function.   Normal RV systolic function.   Nl cardiac valve structures.   Trace MR. Trace TR.   The right ventricular systolic pressure could not be estimated due to   insufficient tricuspid regurgitation signal.   The inferior vena cava is normal in size and response to respiration,   consistent with normal right-sided filling pressures.   No previous study.     Echo 10/18/21  CONCLUSIONS  Normal left ventricular systolic function.  The left ventricular ejection fraction is visually estimated to be 55%.  The right ventricle is normal in  size and systolic function.  No prior study is available for comparison.     Cardiac CTA (8/2/2019):  1. 1 vessel obstructive coronary artery disease, with 100% occlusion of mid LAD.  2.  This is a right dominant coronary system.  3.  Coronary calcium score: (Agatston):29.(80 %)     TMST (6/14/2019):  Patient exercised on accelerated Jose protocol to a workload of 14.0 mets.    Symptom-limited treadmill test, stopped due to fatigue.   Normal heart rate response to exercise, with resting heart rate of 64 bpm  and  peak heart rate of 157 bpm, which was 89% of maximum predicted heart rate for age.  Normal blood pressure response to exercise, BP heidi from 120/92 at rest to peak of 159/70 mm Hg.  Pt had gradual incrased mid chest discomfort started 2/10 to 6/10 at peak of exercise and there was no EKG abnormalities with exercise.  No arrhythmias seen.  Advise pt to follow up with Dr. Vick for persistent chest discomfort.   CONCLUSION:  Negative for ischemia at high workload.   Average exercise capacity for age.      [facility] 8/6/19  Dominance: Right   Left main:   Large caliber, bifurcates to the left anterior descending (LAD) and left   circumflex (LCx) arteries.   Ostial: 10%. Mid: 10%. Distal: 10%.   LAD:   A large caliber vessel, wraps around the LV apex; it is 100% occluded in   the mid segment. There is retrograde   Prox: 10%. Mid: 100%.   LCx:   A large caliber, vessel; there are two obtuse marginal branches.   Prox: 10%. Mid: 10%. Distal: 30%.   OM1 has a proximal 60% lesion.   RCA:   A large caliber, vessel which gives rise to large caliber posterior   descending artery (PDA) and large caliber posterolateral vessel (PLV).   Prox: 20%. Mid: 30%. Distal: 20%.   The PDA supplies retrograde collaterals to the LAD and mid to distal   reconstitution is seen.   PCI procedure note:   100% lesion of mid LAD treated with overlapping 3.0x12, 2.5x38mm, 2.25x   24mm Synergy ANN, post dilated with a 3.25mmNC balloon proximally  and a   mid to distal 2.5mm NC balloon, converting it to 0% residual stenosis.   Final angiography revealed an excellent result, notable for 0% residual   stenosis, LIZ III flow and no evidence of dissection or perforation.     Femoral angiogram:   right femoral angiography revealed arterial access at the mid femoral   head, above the femoral bifurcation and below the origin of the inferior   epigastric artery     Assessment:   1) Severe mid vessel LAD coronary artery disease.   2) Successful stenting of the mid LAD  lesion with overlapping 3.0x12,   2.5x38mm, 2.25x 24mm Synergy ANN, post dilated with a 3.25mmNC balloon   proximally and a mid to distal 2.5mm NC balloon, reducing a 100% stenosis   to 0%.     NM Cardiac stress test 10/5/21  NUCLEAR IMAGING INTERPRETATION   No evidence of significant jeopardized viable myocardium or prior myocardial    infarction.   Normal left ventricular size, ejection fraction, and wall motion.   Non-diagnostic st changes at peak stress.    Phelan treadmill score of +5.5, indicating this is a low risk study.   ECG INTERPRETATION   Non-diagnostic st changes at peak stress.    Assessment and Clinical Decision Makin. Coronary artery disease of native artery of native heart with stable angina pectoris (HCC)            The following treatment plan was discussed    Hyperlipidemia  -Continue high intensity statin, Crestor 40mg  -LDL 75,   -Add ezetimibe  -Continue with lifestyle changes    CAD with stable angina pectoris  -Nuc Med stress test in  with no evidence of ischemia  -Given that he is still having recurrence similar to 2019 we will obtain a [facility] with possible intervention  -Add metop XL 25mg nighly, has not tolerated in past d/t low BP, willing to trial again  -Nitro prn  -We discussed s/sx of ACS, when to seek emergent care    Hx of overlapping ANN x3 to mid LAD   -Continues on aspirin 81 mg daily  -Echocardiogram in  with normal EF    The risks, benefits, and  alternatives to coronary angiography with IV sedation were discussed in great detail. Specific risks mentioned include bleeding, infection, kidney damage, allergic reaction, cardiac perforation with possible tamponade requiring pericardiocentesis or possibly open heart surgery. In addition, we discussed that 10% of patients will experience small to moderate bruising at the site of the arterial puncture. Lastly, the risks of heart attack, stroke and death were discussed; the risk of major complications such as heart attack or stroke caused by the angiogram is approximately 1%; the risk of death is approximately 1 in 1000. The patient verbalized understanding of the potential complications and wishes to proceed with this procedure.   Plan reviewed in detail with the patient, verbalizes understanding and is in agreement.  Pt is to follow up with cardiology in 1 year, I will F/U over the phone with results of [facility]     Encouraged Pt to follow up with us over the phone or electronically using my [employer] as cardiac issues/concerns arise.    PLEASE NOTE: This dictation was created using voice recognition software. I have made every reasonable attempt to correct obvious errors, but I expect that there are errors of grammar and possibly content that I did not discover before finalizing the note.       KADIE Garcia.   [facility]  [phone]

## 2023-07-05 NOTE — TELEPHONE ENCOUNTER
----- Message -----   From: VALENTINO Garcia   Sent: 7/5/2023   9:12 AM PDT   To: Mali Ortiz     I placed a LHC with possible PCI on him.

## 2023-07-06 ENCOUNTER — APPOINTMENT (OUTPATIENT)
Dept: ADMISSIONS | Facility: MEDICAL CENTER | Age: 48
End: 2023-07-06
Attending: INTERNAL MEDICINE
Payer: COMMERCIAL

## 2023-07-07 ENCOUNTER — PATIENT MESSAGE (OUTPATIENT)
Dept: CARDIOLOGY | Facility: MEDICAL CENTER | Age: 48
End: 2023-07-07
Payer: COMMERCIAL

## 2023-07-12 NOTE — PROGRESS NOTES
Pt needs all RX's placed to new [facility] pharmacy. Message sent to RN.    [facility] (Store ID: #[id number]) [address] [place] [address] [place] [address].

## 2023-07-13 DIAGNOSIS — I25.118 CORONARY ARTERY DISEASE OF NATIVE ARTERY OF NATIVE HEART WITH STABLE ANGINA PECTORIS (HCC): ICD-10-CM

## 2023-07-13 DIAGNOSIS — E78.5 HYPERLIPIDEMIA, UNSPECIFIED HYPERLIPIDEMIA TYPE: ICD-10-CM

## 2023-07-13 RX ORDER — EZETIMIBE 10 MG/1
10 TABLET ORAL DAILY
Qty: 30 TABLET | Refills: 6 | Status: SHIPPED | OUTPATIENT
Start: 2023-07-13 | End: 2023-07-19 | Stop reason: SDUPTHER

## 2023-07-13 RX ORDER — NITROGLYCERIN 0.4 MG/1
0.4 TABLET SUBLINGUAL PRN
Qty: 25 TABLET | Refills: 0 | Status: SHIPPED | OUTPATIENT
Start: 2023-07-13 | End: 2023-07-19 | Stop reason: SDUPTHER

## 2023-07-13 RX ORDER — METOPROLOL SUCCINATE 25 MG/1
25 TABLET, EXTENDED RELEASE ORAL NIGHTLY
Qty: 30 TABLET | Refills: 3 | Status: SHIPPED | OUTPATIENT
Start: 2023-07-13 | End: 2023-07-19 | Stop reason: SDUPTHER

## 2023-07-13 RX ORDER — ROSUVASTATIN CALCIUM 40 MG/1
40 TABLET, COATED ORAL DAILY
Qty: 90 TABLET | Refills: 3 | Status: SHIPPED | OUTPATIENT
Start: 2023-07-13 | End: 2023-07-19 | Stop reason: SDUPTHER

## 2023-07-14 ENCOUNTER — TELEPHONE (OUTPATIENT)
Dept: CARDIOLOGY | Facility: MEDICAL CENTER | Age: 48
End: 2023-07-14
Payer: COMMERCIAL

## 2023-07-14 NOTE — TELEPHONE ENCOUNTER
----- Message from VALENTINO Garcia sent at 7/12/2023  4:54 PM PDT -----  I'm guessing youre my RN today ....    Can you do me fav...   Can you change all of this patients meds to go to  [facility] (Store ID: #[id number]) [facility] [address] [place] [id number]. I guess his mail order pharmacy is out of his meds.    Thanks,    Rama

## 2023-07-19 DIAGNOSIS — E78.5 HYPERLIPIDEMIA, UNSPECIFIED HYPERLIPIDEMIA TYPE: ICD-10-CM

## 2023-07-19 DIAGNOSIS — I25.118 CORONARY ARTERY DISEASE OF NATIVE ARTERY OF NATIVE HEART WITH STABLE ANGINA PECTORIS (HCC): ICD-10-CM

## 2023-07-19 RX ORDER — EZETIMIBE 10 MG/1
10 TABLET ORAL DAILY
Qty: 30 TABLET | Refills: 6 | Status: SHIPPED | OUTPATIENT
Start: 2023-07-19

## 2023-07-19 RX ORDER — NITROGLYCERIN 0.4 MG/1
0.4 TABLET SUBLINGUAL PRN
Qty: 25 TABLET | Refills: 0 | Status: SHIPPED | OUTPATIENT
Start: 2023-07-19 | End: 2023-10-20

## 2023-07-19 RX ORDER — ROSUVASTATIN CALCIUM 40 MG/1
40 TABLET, COATED ORAL DAILY
Qty: 90 TABLET | Refills: 3 | Status: SHIPPED | OUTPATIENT
Start: 2023-07-19 | End: 2023-10-20 | Stop reason: SDUPTHER

## 2023-07-19 RX ORDER — METOPROLOL SUCCINATE 25 MG/1
25 TABLET, EXTENDED RELEASE ORAL NIGHTLY
Qty: 30 TABLET | Refills: 3 | Status: SHIPPED | OUTPATIENT
Start: 2023-07-19

## 2023-07-20 ENCOUNTER — HOSPITAL ENCOUNTER (OUTPATIENT)
Facility: MEDICAL CENTER | Age: 48
End: 2023-07-20
Attending: NURSE PRACTITIONER
Payer: COMMERCIAL

## 2023-07-20 ENCOUNTER — OFFICE VISIT (OUTPATIENT)
Dept: URGENT CARE | Facility: CLINIC | Age: 48
End: 2023-07-20
Payer: COMMERCIAL

## 2023-07-20 VITALS
SYSTOLIC BLOOD PRESSURE: 118 MMHG | DIASTOLIC BLOOD PRESSURE: 70 MMHG | HEIGHT: 70 IN | RESPIRATION RATE: 14 BRPM | WEIGHT: 184.9 LBS | TEMPERATURE: 97.3 F | BODY MASS INDEX: 26.47 KG/M2 | OXYGEN SATURATION: 95 % | HEART RATE: 68 BPM

## 2023-07-20 DIAGNOSIS — R19.7 ACUTE DIARRHEA: ICD-10-CM

## 2023-07-20 DIAGNOSIS — R10.9 ABDOMINAL CRAMPING: ICD-10-CM

## 2023-07-20 LAB
C DIFF DNA SPEC QL NAA+PROBE: NEGATIVE
C DIFF TOX GENS STL QL NAA+PROBE: NEGATIVE

## 2023-07-20 PROCEDURE — 99214 OFFICE O/P EST MOD 30 MIN: CPT | Performed by: NURSE PRACTITIONER

## 2023-07-20 PROCEDURE — 87899 AGENT NOS ASSAY W/OPTIC: CPT | Mod: 91

## 2023-07-20 PROCEDURE — 3074F SYST BP LT 130 MM HG: CPT | Performed by: NURSE PRACTITIONER

## 2023-07-20 PROCEDURE — 87045 FECES CULTURE AEROBIC BACT: CPT

## 2023-07-20 PROCEDURE — 87493 C DIFF AMPLIFIED PROBE: CPT

## 2023-07-20 PROCEDURE — 3078F DIAST BP <80 MM HG: CPT | Performed by: NURSE PRACTITIONER

## 2023-07-20 RX ORDER — DICYCLOMINE HCL 20 MG
20 TABLET ORAL EVERY 6 HOURS
Qty: 20 TABLET | Refills: 0 | Status: SHIPPED | OUTPATIENT
Start: 2023-07-20

## 2023-07-20 RX ORDER — AZITHROMYCIN 500 MG/1
500 TABLET, FILM COATED ORAL DAILY
Qty: 3 TABLET | Refills: 0 | Status: SHIPPED | OUTPATIENT
Start: 2023-07-20 | End: 2023-07-23

## 2023-07-20 NOTE — PROGRESS NOTES
Chief Complaint   Patient presents with    GI Problem     Pt has diarrhea, stomach pain x 5 days        HISTORY OF PRESENT ILLNESS: Patient is a pleasant 48 y.o. male who presents to urgent care today with complaints of diarrhea.  Patient notes his symptoms started 5 days ago he has had symptoms since.  Initially patient felt feverish, though symptoms have since passed.  Continues to have frequent diarrhea, 4 episodes today.  Endorses associated abdominal cramping.  Denies any vomiting.  Denies any known ill contacts.  Denies any recent antibiotic usage, camping, drinking from streams.  He has not tried any medication for symptom relief.  He is here today with his wife.    Patient Active Problem List    Diagnosis Date Noted    Coronary artery disease of native artery of native heart with stable angina pectoris (HCC) 09/14/2020    Hyperlipidemia 09/14/2020    Stable angina ([facility]) 08/26/2019    Genital herpes simplex 04/02/2019    Actinic keratosis 01/15/2018    Family history of malignant neoplasm of prostate 01/15/2018       Allergies:Patient has no known allergies.    Current Outpatient Medications Ordered in Epic   Medication Sig Dispense Refill    azithromycin (ZITHROMAX) 500 MG tablet Take 1 Tablet by mouth every day for 3 days. 3 Tablet 0    dicyclomine (BENTYL) 20 MG Tab Take 1 Tablet by mouth every 6 hours. 20 Tablet 0    rosuvastatin (CRESTOR) 40 MG tablet Take 1 Tablet by mouth every day. 90 Tablet 3    aspirin EC (ECOTRIN) 81 MG Tablet Delayed Response Take 1 Tab by mouth every day. 90 Tab 3    metoprolol SR (TOPROL XL) 25 MG TABLET SR 24 HR Take 1 Tablet by mouth every evening. 30 Tablet 3    nitroglycerin (NITROSTAT) 0.4 MG SL Tab Place 1 Tablet under the tongue as needed for Chest Pain. 25 Tablet 0    ezetimibe (ZETIA) 10 MG Tab Take 1 Tablet by mouth every day. 30 Tablet 6     No current Epic-ordered facility-administered medications on file.       Past Medical History:   Diagnosis Date    CAD (coronary  "artery disease)        Social History     Tobacco Use    Smoking status: Never    Smokeless tobacco: Never   Vaping Use    Vaping Use: Never used   Substance Use Topics    Alcohol use: Yes     Comment: rare    Drug use: Yes     Types: Marijuana     Comment: CBD,THC       No family status information on file.   History reviewed. No pertinent family history.    ROS:  Review of Systems   Constitutional: Positive for initial tactile fever, chills.   Negative for weight loss, malaise, and fatigue.   HENT: Negative for ear pain, nosebleeds, congestion, sore throat and neck pain.    Eyes: Negative for vision changes.   Neuro: Negative for headache, sensory changes, weakness, seizure, LOC.   Cardiovascular: Negative for chest pain, palpitations, orthopnea and leg swelling.   Respiratory: Negative for cough, sputum production, shortness of breath and wheezing.   Gastrointestinal: Positive for abdominal cramping, nausea, diarrhea.  Negative for vomiting.   Genitourinary: Negative for dysuria, urgency and frequency.  Musculoskeletal: Negative for falls, neck pain, back pain, joint pain, myalgias.   Skin: Negative for rash, diaphoresis.     Exam:  /70 (BP Location: Left arm, Patient Position: Sitting, BP Cuff Size: Adult)   Pulse 68   Temp 36.3 °C (97.3 °F) (Temporal)   Resp 14   Ht 1.778 m (5' 10\")   Wt 83.9 kg (184 lb 14.4 oz)   SpO2 95%   General: well-nourished, well-developed male in NAD  Head: normocephalic, atraumatic  Eyes: PERRLA, no conjunctival injection, acuity grossly intact, lids normal.  Ears: normal shape and symmetry, no tenderness, no discharge. External canals are without any significant edema or erythema. Tympanic membranes are without any inflammation, no effusion. Gross auditory acuity is intact.  Nose: symmetrical without tenderness, no discharge.  Mouth/Throat: reasonable hygiene, no erythema, exudates or tonsillar enlargement.  Neck: no masses, range of motion within normal limits, no " tracheal deviation. No obvious thyroid enlargement.   Lymph: no cervical adenopathy. No supraclavicular adenopathy.   Neuro: alert and oriented. Cranial nerves 1-12 grossly intact. No sensory deficit.   Cardiovascular: regular rate and rhythm. No edema.  Pulmonary: no distress. Chest is symmetrical with respiration, no wheezes, crackles, or rhonchi.   Abdomen: soft, minimal gastric tenderness, no guarding, no hepatosplenomegaly.  Musculoskeletal: no clubbing, appropriate muscle tone, gait is stable.  Skin: warm, dry, intact, no clubbing, no cyanosis, no rashes.   Psych: appropriate mood, affect, judgement.         Assessment/Plan:  1. Acute diarrhea  CULTURE STOOL    C Diff by PCR rflx Toxin    azithromycin (ZITHROMAX) 500 MG tablet      2. Abdominal cramping  CULTURE STOOL    C Diff by PCR rflx Toxin    dicyclomine (BENTYL) 20 MG Tab            The patient is a pleasant 48-year-old male who presents with diarrhea for the past 5 days with associated abdominal cramping.  Stool studies ordered for patient.  Will be treated empirically, suspect infectious etiology, with azithromycin at this time.  Bentyl as needed.  Increase fluid intake, diet as tolerated.  Strict ER precautions advised.  Supportive care, differential diagnoses, and indications for immediate follow-up discussed with patient.   Pathogenesis of diagnosis discussed including typical length and natural progression.   Instructed to return to clinic or nearest emergency department for any change in condition, further concerns, or worsening of symptoms.  Patient states understanding of the plan of care and discharge instructions.  Instructed to make an appointment, for follow up, with his primary care provider.        Please note that this dictation was created using voice recognition software. I have made every reasonable attempt to correct obvious errors, but I expect that there are errors of grammar and possibly content that I did not discover before  finalizing the note.      KADIE Meier.

## 2023-07-21 LAB
E COLI SXT1+2 STL IA: NORMAL
SIGNIFICANT IND 70042: NORMAL
SITE SITE: NORMAL
SOURCE SOURCE: NORMAL

## 2023-07-22 LAB
BACTERIA STL CULT: NORMAL
C JEJUNI+C COLI AG STL QL: NORMAL
E COLI SXT1+2 STL IA: NORMAL
SIGNIFICANT IND 70042: NORMAL
SITE SITE: NORMAL
SOURCE SOURCE: NORMAL

## 2023-07-31 NOTE — TELEPHONE ENCOUNTER
Yenyvd voice message from patient late Friday afternoon. He will not be in town for his procedure scheduled for 7-31-23. Cancel procedure per patient's request. At this time he is not sure when he will be back in town. He will call to reschedule once he knows. KATERYNAI to Rama COMER

## 2023-08-08 ENCOUNTER — HOSPITAL ENCOUNTER (OUTPATIENT)
Dept: LAB | Facility: MEDICAL CENTER | Age: 48
End: 2023-08-08
Attending: NURSE PRACTITIONER
Payer: COMMERCIAL

## 2023-08-08 ENCOUNTER — OFFICE VISIT (OUTPATIENT)
Dept: URGENT CARE | Facility: CLINIC | Age: 48
End: 2023-08-08
Payer: COMMERCIAL

## 2023-08-08 VITALS
SYSTOLIC BLOOD PRESSURE: 110 MMHG | RESPIRATION RATE: 16 BRPM | DIASTOLIC BLOOD PRESSURE: 80 MMHG | OXYGEN SATURATION: 96 % | TEMPERATURE: 97.5 F | HEART RATE: 72 BPM

## 2023-08-08 DIAGNOSIS — R17 JAUNDICE: ICD-10-CM

## 2023-08-08 DIAGNOSIS — Z76.89 ENCOUNTER TO ESTABLISH CARE WITH NEW DOCTOR: ICD-10-CM

## 2023-08-08 LAB
ALBUMIN SERPL BCP-MCNC: 4.4 G/DL (ref 3.2–4.9)
ALBUMIN/GLOB SERPL: 1.4 G/DL
ALP SERPL-CCNC: 60 U/L (ref 30–99)
ALT SERPL-CCNC: 52 U/L (ref 2–50)
ANION GAP SERPL CALC-SCNC: 7 MMOL/L (ref 7–16)
AST SERPL-CCNC: 31 U/L (ref 12–45)
BASOPHILS # BLD AUTO: 0.5 % (ref 0–1.8)
BASOPHILS # BLD: 0.02 K/UL (ref 0–0.12)
BILIRUB SERPL-MCNC: 0.5 MG/DL (ref 0.1–1.5)
BUN SERPL-MCNC: 17 MG/DL (ref 8–22)
CALCIUM ALBUM COR SERPL-MCNC: 9 MG/DL (ref 8.5–10.5)
CALCIUM SERPL-MCNC: 9.3 MG/DL (ref 8.5–10.5)
CHLORIDE SERPL-SCNC: 103 MMOL/L (ref 96–112)
CO2 SERPL-SCNC: 30 MMOL/L (ref 20–33)
CREAT SERPL-MCNC: 0.94 MG/DL (ref 0.5–1.4)
EOSINOPHIL # BLD AUTO: 0.06 K/UL (ref 0–0.51)
EOSINOPHIL NFR BLD: 1.4 % (ref 0–6.9)
ERYTHROCYTE [DISTWIDTH] IN BLOOD BY AUTOMATED COUNT: 40.6 FL (ref 35.9–50)
GFR SERPLBLD CREATININE-BSD FMLA CKD-EPI: 100 ML/MIN/1.73 M 2
GLOBULIN SER CALC-MCNC: 3.1 G/DL (ref 1.9–3.5)
GLUCOSE SERPL-MCNC: 80 MG/DL (ref 65–99)
HAV IGM SERPL QL IA: NORMAL
HBV CORE IGM SER QL: NORMAL
HBV SURFACE AG SER QL: NORMAL
HCT VFR BLD AUTO: 46.3 % (ref 42–52)
HCV AB SER QL: NORMAL
HGB BLD-MCNC: 15.3 G/DL (ref 14–18)
IMM GRANULOCYTES # BLD AUTO: 0.01 K/UL (ref 0–0.11)
IMM GRANULOCYTES NFR BLD AUTO: 0.2 % (ref 0–0.9)
LYMPHOCYTES # BLD AUTO: 1.29 K/UL (ref 1–4.8)
LYMPHOCYTES NFR BLD: 29.5 % (ref 22–41)
MCH RBC QN AUTO: 28.8 PG (ref 27–33)
MCHC RBC AUTO-ENTMCNC: 33 G/DL (ref 32.3–36.5)
MCV RBC AUTO: 87.2 FL (ref 81.4–97.8)
MONOCYTES # BLD AUTO: 0.46 K/UL (ref 0–0.85)
MONOCYTES NFR BLD AUTO: 10.5 % (ref 0–13.4)
NEUTROPHILS # BLD AUTO: 2.53 K/UL (ref 1.82–7.42)
NEUTROPHILS NFR BLD: 57.9 % (ref 44–72)
NRBC # BLD AUTO: 0 K/UL
NRBC BLD-RTO: 0 /100 WBC (ref 0–0.2)
PLATELET # BLD AUTO: 210 K/UL (ref 164–446)
PMV BLD AUTO: 10.5 FL (ref 9–12.9)
POTASSIUM SERPL-SCNC: 4.2 MMOL/L (ref 3.6–5.5)
PROT SERPL-MCNC: 7.5 G/DL (ref 6–8.2)
RBC # BLD AUTO: 5.31 M/UL (ref 4.7–6.1)
SODIUM SERPL-SCNC: 140 MMOL/L (ref 135–145)
WBC # BLD AUTO: 4.4 K/UL (ref 4.8–10.8)

## 2023-08-08 PROCEDURE — 99213 OFFICE O/P EST LOW 20 MIN: CPT | Performed by: NURSE PRACTITIONER

## 2023-08-08 PROCEDURE — 36415 COLL VENOUS BLD VENIPUNCTURE: CPT

## 2023-08-08 PROCEDURE — 80053 COMPREHEN METABOLIC PANEL: CPT

## 2023-08-08 PROCEDURE — 3074F SYST BP LT 130 MM HG: CPT | Performed by: NURSE PRACTITIONER

## 2023-08-08 PROCEDURE — 80074 ACUTE HEPATITIS PANEL: CPT

## 2023-08-08 PROCEDURE — 3079F DIAST BP 80-89 MM HG: CPT | Performed by: NURSE PRACTITIONER

## 2023-08-08 PROCEDURE — 85025 COMPLETE CBC W/AUTO DIFF WBC: CPT

## 2023-08-08 ASSESSMENT — ENCOUNTER SYMPTOMS
GASTROINTESTINAL NEGATIVE: 1
NEUROLOGICAL NEGATIVE: 1
MUSCULOSKELETAL NEGATIVE: 1
FEVER: 0
DIAPHORESIS: 0
CONSTITUTIONAL NEGATIVE: 1
ABDOMINAL PAIN: 0
DIARRHEA: 0
EYES NEGATIVE: 1
ROS SKIN COMMENTS: YELLOWING OF SKIN
VOMITING: 0
RESPIRATORY NEGATIVE: 1
PSYCHIATRIC NEGATIVE: 1
NAUSEA: 0
CONSTIPATION: 0
CARDIOVASCULAR NEGATIVE: 1
WEIGHT LOSS: 0
CHILLS: 0

## 2023-08-09 ENCOUNTER — HOSPITAL ENCOUNTER (OUTPATIENT)
Dept: LAB | Facility: MEDICAL CENTER | Age: 48
End: 2023-08-09
Attending: NURSE PRACTITIONER
Payer: COMMERCIAL

## 2023-08-09 ENCOUNTER — PHARMACY VISIT (OUTPATIENT)
Dept: PHARMACY | Facility: MEDICAL CENTER | Age: 48
End: 2023-08-09
Payer: COMMERCIAL

## 2023-08-09 LAB
ALBUMIN SERPL BCP-MCNC: 4.4 G/DL (ref 3.2–4.9)
ALBUMIN/GLOB SERPL: 1.6 G/DL
ALP SERPL-CCNC: 54 U/L (ref 30–99)
ALT SERPL-CCNC: 52 U/L (ref 2–50)
ANION GAP SERPL CALC-SCNC: 8 MMOL/L (ref 7–16)
AST SERPL-CCNC: 30 U/L (ref 12–45)
BILIRUB SERPL-MCNC: 0.5 MG/DL (ref 0.1–1.5)
BUN SERPL-MCNC: 19 MG/DL (ref 8–22)
CALCIUM ALBUM COR SERPL-MCNC: 9.1 MG/DL (ref 8.5–10.5)
CALCIUM SERPL-MCNC: 9.4 MG/DL (ref 8.5–10.5)
CHLORIDE SERPL-SCNC: 102 MMOL/L (ref 96–112)
CO2 SERPL-SCNC: 27 MMOL/L (ref 20–33)
CREAT SERPL-MCNC: 0.88 MG/DL (ref 0.5–1.4)
FASTING STATUS PATIENT QL REPORTED: NORMAL
GFR SERPLBLD CREATININE-BSD FMLA CKD-EPI: 106 ML/MIN/1.73 M 2
GLOBULIN SER CALC-MCNC: 2.8 G/DL (ref 1.9–3.5)
GLUCOSE SERPL-MCNC: 87 MG/DL (ref 65–99)
POTASSIUM SERPL-SCNC: 3.9 MMOL/L (ref 3.6–5.5)
PROT SERPL-MCNC: 7.2 G/DL (ref 6–8.2)
SODIUM SERPL-SCNC: 137 MMOL/L (ref 135–145)

## 2023-08-09 PROCEDURE — RXMED WILLOW AMBULATORY MEDICATION CHARGE: Performed by: INTERNAL MEDICINE

## 2023-08-09 PROCEDURE — 80053 COMPREHEN METABOLIC PANEL: CPT

## 2023-08-09 PROCEDURE — 36415 COLL VENOUS BLD VENIPUNCTURE: CPT

## 2023-08-09 RX ORDER — HEPATITIS B VACCINE (RECOMBINANT) ADJUVANTED 20 UG/.5ML
INJECTION, SOLUTION INTRAMUSCULAR
Qty: 0.5 ML | Refills: 0 | Status: SHIPPED | OUTPATIENT
Start: 2023-08-09 | End: 2023-09-06 | Stop reason: SDUPTHER

## 2023-08-09 NOTE — PROGRESS NOTES
Subjective:   Konstantin Cheung is a 48 y.o. male who presents for Other (Skin color is different, feels normal,  no symptoms)      Patient presents for evaluation of yellowing skin times two weeks. Patient states that he has had his wife and his friend, who is a doctor, tell him that he appears to have jaundiced skin they have been noticing for the past two weeks.  Patient reports that his PCP has just retired and he does need to establish with new provider, and is here today for evaluation.  He reports that he overall feels well.  He denies abdominal pain, fever, fatigue, night sweats, malaise, or chills. He reports he had a bout of diarrhea about a month ago, for which he had stool studies performed, and these were negative.  He did take a course of antibiotics and his symptoms resolved.  Otherwise, he feels well and would like labs as well as referral to PCP today.         Review of Systems   Constitutional: Negative.  Negative for chills, diaphoresis, fever, malaise/fatigue and weight loss.   HENT: Negative.     Eyes: Negative.    Respiratory: Negative.     Cardiovascular: Negative.    Gastrointestinal: Negative.  Negative for abdominal pain, constipation, diarrhea, nausea and vomiting.   Genitourinary: Negative.    Musculoskeletal: Negative.    Skin: Negative.         Yellowing of skin   Neurological: Negative.    Endo/Heme/Allergies: Negative.    Psychiatric/Behavioral: Negative.         Medications, Allergies, and current problem list reviewed today in Epic.     Objective:     /80 (BP Location: Left arm, Patient Position: Sitting, BP Cuff Size: Large adult)   Pulse 72   Temp 36.4 °C (97.5 °F) (Temporal)   Resp 16   SpO2 96%     Physical Exam  Vitals reviewed.   Constitutional:       General: He is not in acute distress.     Appearance: Normal appearance. He is normal weight. He is not ill-appearing.   HENT:      Head: Normocephalic and atraumatic.      Nose: Nose normal.      Mouth/Throat:       Mouth: Mucous membranes are moist.      Pharynx: Oropharynx is clear.   Eyes:      Extraocular Movements: Extraocular movements intact.      Conjunctiva/sclera: Conjunctivae normal.      Pupils: Pupils are equal, round, and reactive to light.   Cardiovascular:      Rate and Rhythm: Normal rate and regular rhythm.      Pulses: Normal pulses.      Heart sounds: Normal heart sounds.   Pulmonary:      Effort: Pulmonary effort is normal.      Breath sounds: Normal breath sounds.   Abdominal:      General: Abdomen is flat. Bowel sounds are normal.      Palpations: Abdomen is soft.   Musculoskeletal:         General: Normal range of motion.      Cervical back: Normal range of motion and neck supple.   Skin:     General: Skin is warm and dry.      Capillary Refill: Capillary refill takes less than 2 seconds.      Coloration: Skin is jaundiced.      Comments: Possibly mild jaundice noted to patient's arms, face and abdomen. No scleral icterus.     Neurological:      General: No focal deficit present.      Mental Status: He is alert and oriented to person, place, and time.   Psychiatric:         Mood and Affect: Mood normal.         Behavior: Behavior normal.         Assessment/Plan:     Diagnosis and associated orders:     1. Jaundice  CBC WITH DIFFERENTIAL    Comp Metabolic Panel    HEPATITIS PANEL ACUTE(4 COMPONENTS)    Referral to Gastroenterology    CANCELED: BILIRUBIN TOTAL      2. Encounter to establish care with new doctor  Referral to establish with [facility]         Comments/MDM:     Patient was given labs today, which he will get tomorrow. He was also referred to establish with PCP and to GI today.  We discussed that if he has jaundice, it is almost imperceptible, and as he has no symptoms, I feel he is appropriate to workup as an outpatient. Patient will be notified of his results today and further recommendations will follow based on lab work. If he develops any abdominal pain or worsening jaundice, he will  present to the ER for further evaluation. Patient verbalizes understanding and is in agreement with the plan.          Differential diagnosis, natural history, supportive care, and indications for immediate follow-up discussed.    Advised the patient to follow-up with the primary care physician for recheck, reevaluation, and consideration of further management.    Please note that this dictation was created using voice recognition software. I have made a reasonable attempt to correct obvious errors, but I expect that there are errors of grammar and possibly content that I did not discover before finalizing the note.    This note was electronically signed by SOULEYMANE Gan

## 2023-08-25 ENCOUNTER — TELEPHONE (OUTPATIENT)
Dept: HEALTH INFORMATION MANAGEMENT | Facility: OTHER | Age: 48
End: 2023-08-25
Payer: COMMERCIAL

## 2023-09-06 ENCOUNTER — PHARMACY VISIT (OUTPATIENT)
Dept: PHARMACY | Facility: MEDICAL CENTER | Age: 48
End: 2023-09-06
Payer: COMMERCIAL

## 2023-09-06 PROCEDURE — RXMED WILLOW AMBULATORY MEDICATION CHARGE: Performed by: INTERNAL MEDICINE

## 2023-09-06 RX ORDER — HEPATITIS B VACCINE (RECOMBINANT) ADJUVANTED 20 UG/.5ML
INJECTION, SOLUTION INTRAMUSCULAR
Qty: 0.5 ML | Refills: 0 | Status: SHIPPED | OUTPATIENT
Start: 2023-09-06 | End: 2023-10-20

## 2023-10-20 ENCOUNTER — TELEPHONE (OUTPATIENT)
Dept: CARDIOLOGY | Facility: MEDICAL CENTER | Age: 48
End: 2023-10-20
Payer: COMMERCIAL

## 2023-10-20 DIAGNOSIS — E78.5 HYPERLIPIDEMIA, UNSPECIFIED HYPERLIPIDEMIA TYPE: ICD-10-CM

## 2023-10-20 RX ORDER — ROSUVASTATIN CALCIUM 40 MG/1
40 TABLET, COATED ORAL DAILY
Qty: 90 TABLET | Refills: 2 | Status: SHIPPED | OUTPATIENT
Start: 2023-10-20 | End: 2023-10-20

## 2023-10-20 RX ORDER — ROSUVASTATIN CALCIUM 40 MG/1
40 TABLET, COATED ORAL DAILY
Qty: 90 TABLET | Refills: 2 | Status: SHIPPED | OUTPATIENT
Start: 2023-10-20

## 2023-10-20 NOTE — TELEPHONE ENCOUNTER
Requested Prescriptions      No prescriptions requested or ordered in this encounter       Was the patient seen in the last year in this department? Yes    Does the patient have assisted Plus and need 100 day supply (blood pressure, diabetes and cholesterol meds only)? Patient does not have SCP    Is the patient due for lab work? no      ----- Message from Konstantin Cheung sent at 10/20/2023  8:33 AM PDT -----  Regarding: Medication Refill  Contact: [phone]  Anay Recio,  I need a refill for Rosuvastain please.  Thank you very much  Regards,  Konstantin Cheung

## 2024-07-24 DIAGNOSIS — E78.5 HYPERLIPIDEMIA, UNSPECIFIED HYPERLIPIDEMIA TYPE: ICD-10-CM

## 2024-07-24 RX ORDER — ROSUVASTATIN CALCIUM 40 MG/1
40 TABLET, COATED ORAL DAILY
Qty: 90 TABLET | Refills: 0 | Status: SHIPPED | OUTPATIENT
Start: 2024-07-24

## 2024-09-17 DIAGNOSIS — E78.5 HYPERLIPIDEMIA, UNSPECIFIED HYPERLIPIDEMIA TYPE: ICD-10-CM

## 2024-09-17 RX ORDER — ROSUVASTATIN CALCIUM 40 MG/1
40 TABLET, COATED ORAL DAILY
Qty: 90 TABLET | Refills: 0 | OUTPATIENT
Start: 2024-09-17

## 2024-11-27 NOTE — TELEPHONE ENCOUNTER
Is the patient due for a refill? Yes    Was the patient seen the past year? Yes    Date of last office visit: 8/30/21    Does the patient have an upcoming appointment?  No    Provider to refill:MR    Does the patients insurance require a 100 day supply?  No     Poor